# Patient Record
Sex: FEMALE | Race: WHITE | NOT HISPANIC OR LATINO | Employment: UNEMPLOYED | URBAN - METROPOLITAN AREA
[De-identification: names, ages, dates, MRNs, and addresses within clinical notes are randomized per-mention and may not be internally consistent; named-entity substitution may affect disease eponyms.]

---
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
## 2023-01-11 ENCOUNTER — HOSPITAL ENCOUNTER (EMERGENCY)
Facility: MEDICAL CENTER | Age: 18
End: 2023-01-11
Attending: OBSTETRICS & GYNECOLOGY | Admitting: OBSTETRICS & GYNECOLOGY
Payer: MEDICAID

## 2023-01-11 ENCOUNTER — HOSPITAL ENCOUNTER (EMERGENCY)
Facility: MEDICAL CENTER | Age: 18
End: 2023-01-11
Payer: MEDICAID

## 2023-01-11 VITALS
SYSTOLIC BLOOD PRESSURE: 111 MMHG | RESPIRATION RATE: 16 BRPM | OXYGEN SATURATION: 99 % | HEART RATE: 98 BPM | DIASTOLIC BLOOD PRESSURE: 74 MMHG | WEIGHT: 156.97 LBS | HEIGHT: 64 IN | TEMPERATURE: 98.5 F | BODY MASS INDEX: 26.8 KG/M2

## 2023-01-11 VITALS
OXYGEN SATURATION: 98 % | BODY MASS INDEX: 26.29 KG/M2 | HEART RATE: 99 BPM | WEIGHT: 154 LBS | SYSTOLIC BLOOD PRESSURE: 108 MMHG | RESPIRATION RATE: 14 BRPM | HEIGHT: 64 IN | DIASTOLIC BLOOD PRESSURE: 72 MMHG | TEMPERATURE: 97.8 F

## 2023-01-11 LAB
APPEARANCE UR: CLEAR
COLOR UR AUTO: NORMAL
GLUCOSE UR QL STRIP.AUTO: NEGATIVE MG/DL
KETONES UR QL STRIP.AUTO: NEGATIVE MG/DL
LEUKOCYTE ESTERASE UR QL STRIP.AUTO: NEGATIVE
NITRITE UR QL STRIP.AUTO: NEGATIVE
PH UR STRIP.AUTO: 6 [PH] (ref 5–8)
PROT UR QL STRIP: NEGATIVE MG/DL
RBC UR QL AUTO: NEGATIVE
SP GR UR STRIP.AUTO: 1.02 (ref 1–1.03)

## 2023-01-11 PROCEDURE — 99281 EMR DPT VST MAYX REQ PHY/QHP: CPT | Performed by: NURSE PRACTITIONER

## 2023-01-11 PROCEDURE — 99284 EMERGENCY DEPT VISIT MOD MDM: CPT

## 2023-01-11 PROCEDURE — 302449 STATCHG TRIAGE ONLY (STATISTIC)

## 2023-01-11 PROCEDURE — 81002 URINALYSIS NONAUTO W/O SCOPE: CPT

## 2023-01-11 PROCEDURE — 700102 HCHG RX REV CODE 250 W/ 637 OVERRIDE(OP): Performed by: NURSE PRACTITIONER

## 2023-01-11 PROCEDURE — A9270 NON-COVERED ITEM OR SERVICE: HCPCS | Performed by: NURSE PRACTITIONER

## 2023-01-11 RX ORDER — ACETAMINOPHEN 325 MG/1
650 TABLET ORAL EVERY 4 HOURS PRN
COMMUNITY
End: 2023-03-30

## 2023-01-11 RX ORDER — DIPHENOXYLATE HYDROCHLORIDE AND ATROPINE SULFATE 2.5; .025 MG/1; MG/1
1 TABLET ORAL ONCE
Status: COMPLETED | OUTPATIENT
Start: 2023-01-11 | End: 2023-01-11

## 2023-01-11 RX ADMIN — DIPHENOXYLATE HYDROCHLORIDE AND ATROPINE SULFATE 1 TABLET: 2.5; .025 TABLET ORAL at 08:29

## 2023-01-11 RX ADMIN — LIDOCAINE HYDROCHLORIDE 30 ML: 20 SOLUTION OROPHARYNGEAL at 08:28

## 2023-01-11 ASSESSMENT — PAIN SCALES - GENERAL: PAINLEVEL: 3

## 2023-01-11 ASSESSMENT — FIBROSIS 4 INDEX
FIB4 SCORE: 0.37
FIB4 SCORE: 0.37

## 2023-01-11 NOTE — PROGRESS NOTES
Pt presents to L&D with complaints of upper abdominal pain. Pt ambulated to LDA 2 for assessment.     0716 TOCO and EFM applied, VSS. Pt reports +FM, denies LOF, VB or UC's/ cramping. Pt states she started to develop this upper abdominal pain Monday. Pt has tried taking some tylenol and tums but it has not helped. Pts abdomen is soft but tender in the upper portion. Pt also reports some diarrhea this morning. Pt has not taken anything for the diarrhea. See UA.     0708 JOELLEN Juares updated, orders for GI cocktail and lomotil received.     0755 RN at bedside, pt updated on POC. Pts adoptive mother is at bedside now and reports that the pt has had issues in the past with her GI and was told to stay away from spicy foods. Upon reviewing pts diet, it appears soda and spicy food is what makes her pain worse. Pt educated on pregnancy and how even small amounts can affect her GI tract if she has a history of GI issues in the past.  RN will provide pt with diet recommendations upon leaving if appropriate.     6916 RN at bedside, pt medicated per MAR

## 2023-01-11 NOTE — PROGRESS NOTES
Pt presents to L&D with complaints of upper abdominal pain. Pt ambulated to LDA 2 for assessment.     0716 TOCO and EFM applied, VSS. Pt reports +FM, denies LOF, VB or UC's/ cramping. Pt states she started to develop this upper abdominal pain Monday. Pt has tried taking some tylenol and tums but it has not helped. Pts abdomen is soft but tender in the upper portion. Pt also reports some diarrhea this morning. Pt has not taken anything for the diarrhea. See UA.     0735 JOELLEN Juares updated, orders for GI cocktail and lomotil received.     0755 RN at bedside, pt updated on POC. Pts adoptive mother is at bedside now and reports that the pt has had issues in the past with her GI and was told to stay away from spicy foods. Upon reviewing pts diet, it appears soda and spicy food is what makes her pain worse. Pt educated on pregnancy and how even small amounts can affect her GI tract if she has a history of GI issues in the past.  RN will provide pt with diet recommendations upon leaving if appropriate.     0826 RN at bedside, pt medicated per MAR    0905 RN at bedside, pt feeling better. Pt educated on OTC medications she can take per MD. Pt encouraged to bring up her concerns at her next office visit if the discomfort hasn't resolved. RN will provide pt with diet recommendations related to her hiatal hernia.     0910

## 2023-01-11 NOTE — ED PROVIDER NOTES
"  PATIENT ID:  NAME:  Humaira Mejia  MRN:               [id number]  YOB: 2005     17 y.o. female  at 24w2d.    Subjective: Pt reports epigastric pain with diarrhea this am  Pregnancy complicated by teenage pregnancy, hx of anxiety and depression, hx of medication overdose.    negative  For CTXS.   positive Feels abdominal pain   negative for LOF  negative for vaginal bleeding.   positive for fetal movement    ROS: Patient denies any fever chills, nausea, vomiting, headache, chest pain, shortness of breath, or dysuria or unusual swelling of hands or feet.     Objective:    Vitals:    23 0716 23 0717   BP: 108/72    Pulse: (!) 121 (!) 122   Temp: 36.6 °C (97.8 °F)    TempSrc: Temporal    SpO2:  98%   Weight: 69.9 kg (154 lb)    Height: 1.626 m (5' 4\")      Temp (24hrs), Av.6 °C (97.8 °F), Min:36.6 °C (97.8 °F), Max:36.6 °C (97.8 °F)    General: No acute distress, resting comfortably in bed.  HEENT: normocephalic, nontraumatic, PERRLA, EOMI  Cardiovascular: Heart RRR with no murmurs, rubs or gallops. Distal Pulses 2+  Respiratory: symmetric chest expansion, lungs CTAB, with no wheezes, rales, rhonci  Abdomen: gravid, nontender  Musculoskeletal: strength 5/5 in four extremities  Neuro: non focal with no numbness, tingling or changes in sensation    Cervix:  deferred  [place]: Uterine Contractions: none.   FHRM: Baseline 150, Accels to 160, no decels, minimal variability, adequate for gestational age    Assessment: 17 y.o. female  at 24w2d.      Plan:   1. GI cocktail   2. Lomotil  3. Oral hydration  4. May discharge if feeling better after medicationa nd hydration  5. Instructions for gallbladder diet  6. Follow up in office for MARY or prn if symptoms worsen    Report to Dr Chawla, resident on call    "

## 2023-01-18 RX ORDER — PNV NO.95/FERROUS FUM/FOLIC AC 28MG-0.8MG
TABLET ORAL
Qty: 30 TABLET | Refills: 6 | Status: SHIPPED | OUTPATIENT
Start: 2023-01-18 | End: 2023-07-05

## 2023-01-20 ENCOUNTER — TELEPHONE (OUTPATIENT)
Dept: OBGYN | Facility: CLINIC | Age: 18
End: 2023-01-20
Payer: MEDICAID

## 2023-01-20 NOTE — TELEPHONE ENCOUNTER
Pt called because she is experiencing abdominal pain that comes and goes but doesn't intensify. I stated can be normal pt was given labor precautions.  asked the pt has she been drinking water pt stated no so I stated drinking atleast 2 lters of water a day is necessary. Pt was adamant on going to labor and delivery. I stated unfortunately she is isn't far enough along for labor and delivery but if she is concerned she can go to the emergency room pt stated she understands and call was disconnected.

## 2023-01-25 ENCOUNTER — HOSPITAL ENCOUNTER (OUTPATIENT)
Dept: LAB | Facility: MEDICAL CENTER | Age: 18
End: 2023-01-25
Attending: NURSE PRACTITIONER
Payer: MEDICAID

## 2023-01-25 ENCOUNTER — ROUTINE PRENATAL (OUTPATIENT)
Dept: OBGYN | Facility: CLINIC | Age: 18
End: 2023-01-25
Payer: MEDICAID

## 2023-01-25 VITALS — SYSTOLIC BLOOD PRESSURE: 88 MMHG | WEIGHT: 160 LBS | BODY MASS INDEX: 27.46 KG/M2 | DIASTOLIC BLOOD PRESSURE: 48 MMHG

## 2023-01-25 DIAGNOSIS — Z34.02 ENCOUNTER FOR PRENATAL CARE IN SECOND TRIMESTER OF FIRST PREGNANCY: ICD-10-CM

## 2023-01-25 DIAGNOSIS — Z34.02 ENCOUNTER FOR PRENATAL CARE IN SECOND TRIMESTER OF FIRST PREGNANCY: Primary | ICD-10-CM

## 2023-01-25 LAB — GLUCOSE 1H P 50 G GLC PO SERPL-MCNC: 109 MG/DL (ref 70–139)

## 2023-01-25 PROCEDURE — 0502F SUBSEQUENT PRENATAL CARE: CPT | Performed by: NURSE PRACTITIONER

## 2023-01-25 PROCEDURE — 82950 GLUCOSE TEST: CPT

## 2023-01-25 PROCEDURE — 36415 COLL VENOUS BLD VENIPUNCTURE: CPT

## 2023-01-25 ASSESSMENT — FIBROSIS 4 INDEX: FIB4 SCORE: 0.37

## 2023-01-25 NOTE — PROGRESS NOTES
Pt. Here for OB/FU. Reports Good FM.   Pt. Denies VB, LOF, or UC's.   Pt states she has cramping here and there that comes and goes.   Pt currently says she has a stuffy nose and cough today.   Pt states she will be getting her PNP done after today's apt.
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
## 2023-02-05 ENCOUNTER — OFFICE VISIT (OUTPATIENT)
Dept: URGENT CARE | Facility: CLINIC | Age: 18
End: 2023-02-05
Payer: MEDICAID

## 2023-02-05 ENCOUNTER — HOSPITAL ENCOUNTER (OUTPATIENT)
Facility: MEDICAL CENTER | Age: 18
End: 2023-02-05
Attending: NURSE PRACTITIONER
Payer: MEDICAID

## 2023-02-05 VITALS
TEMPERATURE: 97 F | OXYGEN SATURATION: 97 % | HEART RATE: 96 BPM | WEIGHT: 166 LBS | HEIGHT: 64 IN | BODY MASS INDEX: 28.34 KG/M2 | RESPIRATION RATE: 14 BRPM | DIASTOLIC BLOOD PRESSURE: 68 MMHG | SYSTOLIC BLOOD PRESSURE: 102 MMHG

## 2023-02-05 DIAGNOSIS — J06.9 VIRAL URI: ICD-10-CM

## 2023-02-05 DIAGNOSIS — Z3A.27 27 WEEKS GESTATION OF PREGNANCY: ICD-10-CM

## 2023-02-05 PROCEDURE — 0240U HCHG SARS-COV-2 COVID-19 NFCT DS RESP RNA 3 TRGT MIC: CPT

## 2023-02-05 PROCEDURE — 99213 OFFICE O/P EST LOW 20 MIN: CPT | Performed by: NURSE PRACTITIONER

## 2023-02-05 ASSESSMENT — ENCOUNTER SYMPTOMS
CHILLS: 0
HEADACHES: 0
SORE THROAT: 0
RHINORRHEA: 1
NAUSEA: 0
DIZZINESS: 0
WHEEZING: 0
EYE PAIN: 0
VOMITING: 0
FEVER: 0
SWOLLEN GLANDS: 0
COUGH: 1
SHORTNESS OF BREATH: 0
MYALGIAS: 0

## 2023-02-05 ASSESSMENT — FIBROSIS 4 INDEX: FIB4 SCORE: 0.37

## 2023-02-06 DIAGNOSIS — J06.9 VIRAL URI: ICD-10-CM

## 2023-02-06 NOTE — PROGRESS NOTES
"Subjective:   Humaira Mejia is a 17 y.o. female who presents for Cough (X 3 days with congestion. Denies fever or chills. Pt. Is 27 weeks pregnant. )      URI   This is a new problem. Episode onset: 3 days; fiancé ill with similar symptoms is 27 weeks pregnant.  Vaccinated for influenza. The problem has been unchanged. There has been no fever. Associated symptoms include congestion, coughing and rhinorrhea. Pertinent negatives include no chest pain, ear pain, headaches, nausea, plugged ear sensation, rash, sore throat, swollen glands, vomiting or wheezing. She has tried nothing for the symptoms. The treatment provided no relief.     Review of Systems   Constitutional:  Negative for chills and fever.   HENT:  Positive for congestion and rhinorrhea. Negative for ear pain and sore throat.    Eyes:  Negative for pain.   Respiratory:  Positive for cough. Negative for shortness of breath and wheezing.    Cardiovascular:  Negative for chest pain.   Gastrointestinal:  Negative for nausea and vomiting.   Genitourinary:  Negative for hematuria.   Musculoskeletal:  Negative for myalgias.   Skin:  Negative for rash.   Neurological:  Negative for dizziness and headaches.     Medications:    acetaminophen Tabs  Prenatal Vitamins Tabs    Allergies: Horse allergy and Pollen extract    Problem List: Humaira Mejia does not have any pertinent problems on file.    Surgical History:  No past surgical history on file.    Past Social Hx: Humaira Mejia  reports that she has never smoked. She has been exposed to tobacco smoke. She has never used smokeless tobacco. She reports that she does not drink alcohol and does not use drugs.     Past Family Hx:  Humaira Mejia family history includes No Known Problems in her brother, father, mother, and sister.     Problem list, medications, and allergies reviewed by myself today in Epic.     Objective:     /68   Pulse 96   Temp 36.1 °C (97 °F) (Temporal)   Resp 14   Ht 1.626 m (5' 4\")   Wt 75.3 kg " (166 lb)   LMP 07/24/2022 (Approximate)   SpO2 97%   BMI 28.49 kg/m²     Physical Exam  Vitals and nursing note reviewed.   Constitutional:       General: She is not in acute distress.     Appearance: She is well-developed.   HENT:      Head: Normocephalic and atraumatic.      Right Ear: Tympanic membrane and external ear normal.      Left Ear: Tympanic membrane and external ear normal.      Nose: Nose normal.      Right Sinus: No maxillary sinus tenderness or frontal sinus tenderness.      Left Sinus: No maxillary sinus tenderness or frontal sinus tenderness.      Mouth/Throat:      Mouth: Mucous membranes are moist.      Pharynx: Uvula midline. No posterior oropharyngeal erythema.      Tonsils: No tonsillar exudate or tonsillar abscesses.   Eyes:      General:         Right eye: No discharge.         Left eye: No discharge.      Conjunctiva/sclera: Conjunctivae normal.   Cardiovascular:      Rate and Rhythm: Normal rate.   Pulmonary:      Effort: Pulmonary effort is normal. No respiratory distress.      Breath sounds: Normal breath sounds.   Abdominal:      General: There is no distension.   Musculoskeletal:         General: Normal range of motion.   Skin:     General: Skin is warm and dry.   Neurological:      General: No focal deficit present.      Mental Status: She is alert and oriented to person, place, and time. Mental status is at baseline.      Gait: Gait (gait at baseline) normal.   Psychiatric:         Judgment: Judgment normal.       Assessment/Plan:     Diagnosis and associated orders:     1. Viral URI  CoV-2 and Flu A/B by PCR (24 hour In-House): Collect NP swab in VTM      2. 27 weeks gestation of pregnancy             Comments/MDM:     The patient's presenting symptoms and exam findings most likely are due to a viral etiology.     Test for COVID-19 via PCR. Result will be reviewed by myself. We will call/message back for results and appropriate further instructions. Instructed to sign up for  [employer] if they have not already. Result will be automatically released to [employer] application for patient review. I will be sending a message with Next Step Instructions to [employer] soon after resulted.   Symptomatic and supportive care:   Plenty of oral hydration and rest   Over the counter cough suppressant as directed.  Tylenol or ibuprofen for pain and fever as directed.   Warm salt water gargles for sore throat, soft foods, cool liquids.   Saline nasal spray and Flonase  Infection control measures at home. Stay away from people, Hand washing, covering sneeze/cough, disinfect surfaces.   Remain home from work, school, and other populated environments. Work note provided with information of quarantine measures per CDC guidelines.   Overall, the patient is well-appearing. They are not hypoxic, afebrile, and a normal pulmonary exam.    Differential diagnosis, natural history, supportive care, and indications for immediate follow-up discussed.                  Please note that this dictation was created using voice recognition software. I have made a reasonable attempt to correct obvious errors, but I expect that there are errors of grammar and possibly content that I did not discover before finalizing the note.    This note was electronically signed by Hector JORGE.

## 2023-02-07 LAB
FLUAV RNA SPEC QL NAA+PROBE: NEGATIVE
FLUBV RNA SPEC QL NAA+PROBE: NEGATIVE
SARS-COV-2 RNA RESP QL NAA+PROBE: NOTDETECTED
SPECIMEN SOURCE: NORMAL

## 2023-02-15 ENCOUNTER — ROUTINE PRENATAL (OUTPATIENT)
Dept: OBGYN | Facility: CLINIC | Age: 18
End: 2023-02-15
Payer: MEDICAID

## 2023-02-15 VITALS — BODY MASS INDEX: 29.01 KG/M2 | WEIGHT: 169 LBS | SYSTOLIC BLOOD PRESSURE: 110 MMHG | DIASTOLIC BLOOD PRESSURE: 64 MMHG

## 2023-02-15 DIAGNOSIS — O26.843 UTERINE SIZE DATE DISCREPANCY PREGNANCY, THIRD TRIMESTER: ICD-10-CM

## 2023-02-15 DIAGNOSIS — Z34.03 ENCOUNTER FOR PRENATAL CARE IN THIRD TRIMESTER OF FIRST PREGNANCY: Primary | ICD-10-CM

## 2023-02-15 PROCEDURE — 90715 TDAP VACCINE 7 YRS/> IM: CPT | Performed by: NURSE PRACTITIONER

## 2023-02-15 PROCEDURE — 0502F SUBSEQUENT PRENATAL CARE: CPT | Performed by: NURSE PRACTITIONER

## 2023-02-15 PROCEDURE — 90471 IMMUNIZATION ADMIN: CPT | Performed by: NURSE PRACTITIONER

## 2023-02-15 ASSESSMENT — FIBROSIS 4 INDEX: FIB4 SCORE: 0.37
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
## 2023-03-01 ENCOUNTER — APPOINTMENT (OUTPATIENT)
Dept: RADIOLOGY | Facility: IMAGING CENTER | Age: 18
End: 2023-03-01
Attending: NURSE PRACTITIONER
Payer: MEDICAID

## 2023-03-01 ENCOUNTER — ROUTINE PRENATAL (OUTPATIENT)
Dept: OBGYN | Facility: CLINIC | Age: 18
End: 2023-03-01
Payer: MEDICAID

## 2023-03-01 VITALS — WEIGHT: 172 LBS | DIASTOLIC BLOOD PRESSURE: 70 MMHG | SYSTOLIC BLOOD PRESSURE: 100 MMHG | BODY MASS INDEX: 29.52 KG/M2

## 2023-03-01 DIAGNOSIS — Z34.03 ENCOUNTER FOR PRENATAL CARE IN THIRD TRIMESTER OF FIRST PREGNANCY: ICD-10-CM

## 2023-03-01 DIAGNOSIS — O26.843 UTERINE SIZE DATE DISCREPANCY PREGNANCY, THIRD TRIMESTER: ICD-10-CM

## 2023-03-01 PROCEDURE — 0502F SUBSEQUENT PRENATAL CARE: CPT | Performed by: PHYSICIAN ASSISTANT

## 2023-03-01 PROCEDURE — 76816 OB US FOLLOW-UP PER FETUS: CPT | Mod: TC | Performed by: NURSE PRACTITIONER

## 2023-03-01 ASSESSMENT — FIBROSIS 4 INDEX: FIB4 SCORE: 0.37

## 2023-03-01 NOTE — PROGRESS NOTES
OB follow up   + fetal movement.  No VB, LOF or UC's.  Phone # [phone]  Preferred pharmacy confirmed.  NIPT not done yet  Kick count sheet given today.

## 2023-03-01 NOTE — LETTER
"Count Your Baby's Movements  Another step to a healthy delivery    A Epic Dress Re Test             Dept: [phone]    How Many Weeks Pregnant? Unknown    Date to Begin Counting: 3/1/23              How to use this chart    One way for your physician to keep track of your baby's health is by knowing how often the baby moves (or \"kicks\") in your womb.  You can help your physician to do this by using this chart every day.    Every day, you should see how many hours it takes for your baby to move 10 times.  Start in the morning, as soon as you get up.    First, write down the time your baby moves until you get to 10.  Check off one box every time your baby moves until you get to 10.  Write down the time you finished counting in the last column.  Total how long it took to count up all 10 movements.  Finally, fill in the box that shows how long this took.  After counting 10 movements, you no longer have to count any more that day.  The next morning, just start counting again as soon as you get up.    What should you call a \"movement\"?  It is hard to say, because it will feel different from one mother to another and from one pregnancy to the next.  The important thing is that you count the movements the same way throughout your pregnancy.  If you have more questions, you should ask your physician.    Count carefully every day!  SAMPLE:  Week 28    How many hours did it take to feel 10 movements?       Start  Time     1     2     3     4     5     6     7     8     9     10   Finish Time   Mon 8:20           11:40   Tue Wed Thu               Fri               Sat               Sun                 IMPORTANT: You should contact your physician if it takes more than two hours for you to feel 10 movements.  Each morning, write down the time and start to count the movements of your baby.  Keep track by checking off one box every time you feel one movement.  When you have felt 10 \"kicks\", write down " the time you finished counting in the last column.  Then fill in the   box (over the check kiana) for the number of hours it took.  Be sure to read the complete instructions on the previous page.

## 2023-03-01 NOTE — PROGRESS NOTES
S: 17 y.o.  at 31w2d presents for routine obstetric follow-up.   Good fetal movement.  No contractions, vaginal bleeding, or leakage of fluid.    Questions answered.  Reports left ear pain, no URI sx or fever.       O: LMP 2022 (Approximate) Estimated Date of Delivery: 23  Patients' weight gain, fluid intake and exercise level discussed.  Vitals, fundal height , fetal position, and FHR reviewed on flowsheet  Ear: Left canal cerumen impaction, TM obscured. Right TM clear, no erythema.    Lab: 1hGTT completed but not CBC or Syphilis, pt not sure why. Was reprinted last week but pt did not go back to lab.   Pertinent US: 2022- Survey WNL, NICK 17.19cm, c/w prev dating. EFW 72.4%  TDaP: Administered 2/15/23  GBS: N/A  Rh: positive  BTL: Declined 2/15/23    A/P:  17 y.o.  at 31w2d presents for routine obstetric follow-up.  Size equals dates and/or scan    - Has growth US after visit today due to measuring S>D at last visit. Measuring S=D today.   - Reprinted CBC and T Pallidum - encouraged pt to do ASAP  - Instructed on ear hygiene. Recommend Debrox drops before shower to soften and use shower as lavage. Avoid q-tips. Can go to PCP or UC for lavage if not successful at home. Advised TM obscured AOM cannot be r/o.   - Fetal kick counts- tracking given.  - Exercise at least 30 minutes daily. Drink at least 3-4L of water daily  - PTL precautions educated.    Follow-up in 2 weeks.    Mariah Kessler P.A.-C.

## 2023-03-02 ENCOUNTER — HOSPITAL ENCOUNTER (EMERGENCY)
Facility: MEDICAL CENTER | Age: 18
End: 2023-03-02
Attending: EMERGENCY MEDICINE
Payer: MEDICAID

## 2023-03-02 VITALS
DIASTOLIC BLOOD PRESSURE: 55 MMHG | HEART RATE: 102 BPM | OXYGEN SATURATION: 95 % | RESPIRATION RATE: 16 BRPM | SYSTOLIC BLOOD PRESSURE: 111 MMHG | TEMPERATURE: 97.5 F | WEIGHT: 172.18 LBS | BODY MASS INDEX: 29.4 KG/M2 | HEIGHT: 64 IN

## 2023-03-02 DIAGNOSIS — H61.22 IMPACTED CERUMEN OF LEFT EAR: ICD-10-CM

## 2023-03-02 DIAGNOSIS — B34.9 VIRAL ILLNESS: ICD-10-CM

## 2023-03-02 DIAGNOSIS — H92.02 LEFT EAR PAIN: ICD-10-CM

## 2023-03-02 LAB
EKG IMPRESSION: NORMAL
FLUAV RNA SPEC QL NAA+PROBE: NEGATIVE
FLUBV RNA SPEC QL NAA+PROBE: NEGATIVE
RSV RNA SPEC QL NAA+PROBE: NEGATIVE
SARS-COV-2 RNA RESP QL NAA+PROBE: NOTDETECTED
SPECIMEN SOURCE: NORMAL

## 2023-03-02 PROCEDURE — C9803 HOPD COVID-19 SPEC COLLECT: HCPCS | Performed by: EMERGENCY MEDICINE

## 2023-03-02 PROCEDURE — 69210 REMOVE IMPACTED EAR WAX UNI: CPT

## 2023-03-02 PROCEDURE — 700102 HCHG RX REV CODE 250 W/ 637 OVERRIDE(OP): Performed by: EMERGENCY MEDICINE

## 2023-03-02 PROCEDURE — 0241U HCHG SARS-COV-2 COVID-19 NFCT DS RESP RNA 4 TRGT MIC: CPT

## 2023-03-02 PROCEDURE — 93005 ELECTROCARDIOGRAM TRACING: CPT

## 2023-03-02 PROCEDURE — 99283 EMERGENCY DEPT VISIT LOW MDM: CPT

## 2023-03-02 PROCEDURE — 93005 ELECTROCARDIOGRAM TRACING: CPT | Performed by: EMERGENCY MEDICINE

## 2023-03-02 PROCEDURE — A9270 NON-COVERED ITEM OR SERVICE: HCPCS | Performed by: EMERGENCY MEDICINE

## 2023-03-02 RX ORDER — ACETAMINOPHEN 325 MG/1
650 TABLET ORAL ONCE
Status: COMPLETED | OUTPATIENT
Start: 2023-03-02 | End: 2023-03-02

## 2023-03-02 RX ORDER — DOCUSATE SODIUM 50 MG/5ML
100 LIQUID ORAL ONCE
Status: COMPLETED | OUTPATIENT
Start: 2023-03-02 | End: 2023-03-02

## 2023-03-02 RX ADMIN — ACETAMINOPHEN 650 MG: 325 TABLET ORAL at 19:47

## 2023-03-02 RX ADMIN — DOCUSATE SODIUM 100 MG: 50 LIQUID ORAL at 19:47

## 2023-03-02 ASSESSMENT — FIBROSIS 4 INDEX: FIB4 SCORE: 0.37

## 2023-03-03 NOTE — ED NOTES
Patient provided with discharge instructions. Education complete, all questions answered.   Vitals stable. All personal belongings accounted for.   Patient ambulated out of the ER with family.

## 2023-03-03 NOTE — ED TRIAGE NOTES
".  Chief Complaint   Patient presents with    Cold Symptoms     Cough, congestion, headache, ear pain, chest pain, denies fevers. Patient is 31 weeks pregnant.        16 yo female ambulatory to triage for above complaint. EKG upon arrival. Patient is in no apparent distress in triage.      Pt is alert and oriented, speaking in full sentences, follows commands and responds appropriately to questions.      Patient placed back in lobby and educated on triage process. Asked to inform RN of any changes.    /72   Pulse (!) 130   Temp 36.7 °C (98 °F) (Oral)   Resp 18   Ht 1.626 m (5' 4\")   Wt 78.1 kg (172 lb 2.9 oz)   LMP 07/24/2022 (Approximate)   SpO2 96%   BMI 29.55 kg/m²     "

## 2023-03-03 NOTE — ED PROVIDER NOTES
ED Provider Note    CHIEF COMPLAINT  Chief Complaint   Patient presents with    Cold Symptoms     Cough, congestion, headache, ear pain, chest pain, denies fevers. Patient is 31 weeks pregnant.        EXTERNAL RECORDS REVIEWED  Outpatient Notes OB note from 3/1/23    HPI/ROS  LIMITATION TO HISTORY   Select: : None  OUTSIDE HISTORIAN(S):  Family Significant other    Humaira Mejia is a 17 y.o. female who presents to the emergency department for evaluation of cough, congestion, ear pain, and a headache.  The patient states that she has had left-sided ear pain over the last 3 to 4 days.  She has had runny nose, nasal congestion, and a cough.  She has not had any nausea, vomiting, chest pain, or difficulty breathing.  Her boyfriend is sick with similar symptoms.  She denies any known fevers.  She is 31 weeks and 3 days pregnant and is following with OB.  She denies any vaginal bleeding or discharge.  She denies dysuria or hematuria.  She states that she has been feeling the baby move normally.    PAST MEDICAL HISTORY   has a past medical history of Patient denies medical problems.    SURGICAL HISTORY  patient denies any surgical history    FAMILY HISTORY  Family History   Problem Relation Age of Onset    No Known Problems Mother     No Known Problems Father     No Known Problems Sister     No Known Problems Brother        SOCIAL HISTORY  Social History     Tobacco Use    Smoking status: Never     Passive exposure: Yes    Smokeless tobacco: Never   Vaping Use    Vaping Use: Former    Substances: Nicotine    Devices: Disposable   Substance and Sexual Activity    Alcohol use: No    Drug use: No    Sexual activity: Yes     Partners: Male     Birth control/protection: None       CURRENT MEDICATIONS  Home Medications       Reviewed by Ny Hickey R.N. (Registered Nurse) on 03/02/23 at 1723  Med List Status: Partial     Medication Last Dose Status   acetaminophen (TYLENOL) 325 MG Tab  Active   Prenatal Vit-Fe Fumarate-FA  "(PRENATAL VITAMINS) 28-0.8 MG Tab  Active                    ALLERGIES  Allergies   Allergen Reactions    Horse Allergy Itching    Pollen Extract Runny Nose     Runny nose       PHYSICAL EXAM  VITAL SIGNS: /63   Pulse (!) 109   Temp 36.7 °C (98 °F) (Oral)   Resp 18   Ht 1.626 m (5' 4\")   Wt 78.1 kg (172 lb 2.9 oz)   LMP 07/24/2022 (Approximate)   SpO2 100%   BMI 29.55 kg/m²   Constitutional: Alert and in no apparent distress.  HENT: Normocephalic atraumatic. Bilateral external ears normal.  Right TM's clear.  Unable to visualize left TM secondary to cerumen impaction.  Nose normal. Mucous membranes are moist.  Eyes: Pupils are equal and reactive. Conjunctiva normal. Non-icteric sclera.   Neck: Normal range of motion without tenderness. Supple. No meningeal signs.  Cardiovascular: Regular rate and rhythm. No murmurs, gallops or rubs.  Thorax & Lungs: No retractions, nasal flaring, or tachypnea. Breath sounds are clear to auscultation bilaterally. No wheezing, rhonchi or rales.  Abdomen: Soft, nontender and nondistended. No hepatosplenomegaly.  Gravid uterus present above the umbilicus.  Skin: Warm and dry. No rashes are noted.  Back: No bony tenderness, No CVA tenderness.   Extremities: 2+ peripheral pulses. Cap refill is less than 2 seconds. No edema, cyanosis, or clubbing.  Musculoskeletal: Good range of motion in all major joints. No tenderness to palpation or major deformities noted.   Neurologic: Alert and appropriate for age. The patient moves all 4 extremities without obvious deficits.    DIAGNOSTIC STUDIES / PROCEDURES    LABS  Results for orders placed or performed during the hospital encounter of 03/02/23   EKG (NOW)   Result Value Ref Range    Report       [facility] Emergency Dept.    Test Date:  2023-03-02  Pt Name:    VINCENT ARTHUR                  Department: ER  MRN:        [id number]                      Room:  Gender:     Female                       Technician: " [id number]  :        2005                   Requested By:ER TRIAGE PROTOCOL  Order #:    [id number]                    Reading MD: Halina Reno    Measurements  Intervals                                Axis  Rate:       144                          P:          43  FL:         116                          QRS:        46  QRSD:       83                           T:          -33  QT:         282  QTc:        437    Interpretive Statements  Sinus tachycardia  Nonspecific T abnormalities, inferior leads  Compared to ECG 2019 17:25:38  T-wave abnormality now present  Sinus rhythm no longer present  Electronically Signed On 3-2-2023 19:22:03 PST by Halina Reno       Ear Cerumen Removal Procedure    Indication: ear cerumen impaction    Procedure: After placing the patient's head in the appropriate position, the patient's left ear canal was irrigated with the appropriate solution and curetted until the procedure was discontinued at the patient's request.  At this point, the procedure was complete.     The patient tolerated the procedure with difficulty.    Complications: None      COURSE & MEDICAL DECISION MAKING    ED Observation Status? Yes; I am placing the patient in to an observation status due to a diagnostic uncertainty as well as therapeutic intensity. Patient placed in observation status at 8:44 PM, 3/2/2023.     Observation plan is as follows: Viral panel and reassessment    INITIAL ASSESSMENT, COURSE AND PLAN  Care Narrative: This is a 17-year-old female presenting to the ED for evaluation of cold symptoms.  On initial evaluation, the patient did not appear to be in any acute distress.  She was noted be tachycardic but the remainder of her vitals were normal.  Her perfusion mental status were appropriate.  Her lung sounds were clear and she had no evidence of focal crackles or rhonchi concerning for bacterial pneumonia.  Additionally she had no history of fevers.  She had full range of motion of her neck and  have extremely low clinical suspicion for meningitis.  She did have an obvious cerumen impaction on the left and I was unable to visualize her tympanic membrane.  I did attempt to remove the cerumen with a curette and then with Colace and subsequent flushing.  She did not tolerate this well in the procedure was discontinued.  Given that she has not had fevers and has only had ear pain for 1 to 2 days I am less concerned for an acute otitis media at this point.  However, she will be prescribed Debrox and encouraged to use it over the next couple of days to hopefully remove the cerumen.    I did perform a limited bedside ultrasound.  Appropriate fetal movement was noted.  Fetal heart rate was measured at 156.    A viral panel was sent and pending.  The patient was signed out to Dr Amin pending results of the viral panel as the patient is a Tamiflu candidate should she be influenza positive.    ADDITIONAL PROBLEM LIST  Viral illness, ear pain, cerumen impaction  DISPOSITION AND DISCUSSIONS  I have discussed management of the patient with the following physicians and JACEY's: Dr. Amin, ERP    Discussion of management with other [facility] or appropriate source(s): None     Escalation of care considered, and ultimately not performed:acute inpatient care management, however at this time, the patient is most appropriate for outpatient management    Barriers to care at this time, including but not limited to:  None .     Decision tools and prescription drugs considered including, but not limited to:  Debrox otic .    FINAL DIAGNOSIS  1. Viral illness    2. Left ear pain    3. Impacted cerumen of left ear        Electronically signed by: Halina Reno D.O., 3/2/2023 7:17 PM

## 2023-03-03 NOTE — DISCHARGE SUMMARY
"  ED Observation Discharge Summary    Patient:Humaira Mejia  Patient : 2005  Patient MRN: [id number]  Patient PCP: Ny Mei D.O.    Admit Date: 3/2/2023  Discharge Date and Time: 23 9:11 PM  Discharge Diagnosis: Viral illness, cough, nasal congestion, ear pain  Discharge Attending: Symone Amin M.D.  Discharge Service: ED Observation    ED Course  Humaira is a 17 y.o. female 31 weeks and 3 days pregnant who was evaluated at [facility] for cough, nasal congestion, ear pain and headache for 3 to 4 days.  Patient is signed out to me for follow-up on flu/COVID/RSV swab.  Patient tolerated p.o. well, vital signs reassuring with heart rate in the low 100s.  Flu, COVID, RSV negative.  Patient was reevaluated by myself.  She denies pleuritic chest pain, shortness of breath, lower extremity calf pain or swelling.  She states she has only chest pain when she coughs.  Symptoms appear more viral in etiology.  She denies any abdominal pain, vaginal bleeding or discharge.  She denies vomiting or diarrhea and has been taking p.o. well.  She has prompt follow-up with her OB.  She is informed of her results, educated on home care for viral illness, given strict ER return precautions.  She is comfortable plan with outpatient follow-up and discharged in good condition.    Discharge Exam:  /63   Pulse (!) 102   Temp 36.7 °C (98 °F) (Oral)   Resp 18   Ht 1.626 m (5' 4\")   Wt 78.1 kg (172 lb 2.9 oz)   LMP 2022 (Approximate)   SpO2 95%   BMI 29.55 kg/m² .    Constitutional: Awake and alert. Nontoxic  HENT:  Grossly normal, moist mucus membranes  Eyes: Grossly normal  Neck: Normal range of motion  Cardiovascular: mild tachycardia at 102 bpm  Thorax & Lungs: No respiratory distress  Abdomen: gravid uterus  Skin:  No pathologic rash. skin warm/pink/well perfused  Extremities: Well perfused, no lower extremity swelling or edema  Psychiatric: Affect normal    Labs  Results for orders placed or performed " during the hospital encounter of 23   COV-2, FLU A/B, AND RSV BY PCR (2-4 HOURS CEPHEID): Collect NP swab in VTM    Specimen: Respirate   Result Value Ref Range    Influenza virus A RNA Negative Negative    Influenza virus B, PCR Negative Negative    RSV, PCR Negative Negative    SARS-CoV-2 by PCR NotDetected     SARS-CoV-2 Source NP Swab    EKG (NOW)   Result Value Ref Range    Report       [facility] Emergency Dept.    Test Date:  2023  Pt Name:    VINCENT ARTHUR                  Department: ER  MRN:        [id number]                      Room:  Gender:     Female                       Technician: [id number]  :        2005                   Requested By:ER TRIAGE PROTOCOL  Order #:    [id number]                    Reading MD: Halina Reno    Measurements  Intervals                                Axis  Rate:       144                          P:          43  MO:         116                          QRS:        46  QRSD:       83                           T:          -33  QT:         282  QTc:        437    Interpretive Statements  Sinus tachycardia  Nonspecific T abnormalities, inferior leads  Compared to ECG 2019 17:25:38  T-wave abnormality now present  Sinus rhythm no longer present  Electronically Signed On 3-2-2023 19:22:03 PST by Halina Reno         Medications:   New Prescriptions    CARBAMIDE PEROXIDE (DEBROX) 6.5 % SOLUTION    Administer 6 Drops into affected ear(s) 2 times a day for 4 days.       My final assessment includes viral illness  Upon Reevaluation, the patient's condition has: Improved; and will be discharged.    Patient discharged from ED Observation status at 10:05 (Time) 3/2/2023 (Date).     Total time spent on this ED Observation discharge encounter is < 30 Minutes    Electronically signed by: Symone Amin M.D., 3/2/2023 9:11 PM

## 2023-03-16 ENCOUNTER — ROUTINE PRENATAL (OUTPATIENT)
Dept: OBGYN | Facility: CLINIC | Age: 18
End: 2023-03-16
Payer: MEDICAID

## 2023-03-16 VITALS — SYSTOLIC BLOOD PRESSURE: 102 MMHG | DIASTOLIC BLOOD PRESSURE: 64 MMHG | BODY MASS INDEX: 30.62 KG/M2 | WEIGHT: 178.4 LBS

## 2023-03-16 DIAGNOSIS — Z34.03 ENCOUNTER FOR PRENATAL CARE IN THIRD TRIMESTER OF FIRST PREGNANCY: ICD-10-CM

## 2023-03-16 PROCEDURE — 0502F SUBSEQUENT PRENATAL CARE: CPT | Performed by: PHYSICIAN ASSISTANT

## 2023-03-16 ASSESSMENT — FIBROSIS 4 INDEX: FIB4 SCORE: 0.37

## 2023-03-16 NOTE — PROGRESS NOTES
Pt has no complaints with cramping, UCs, Vb, LOF though pt has had occ tightening of abd at night only. +FM. Pt taking PNV gummies, c/o feeling very tired, so d/w pt need to take PO FeSO4 and incr calcium as well. Pt very receptive. GBS at 36wk. Pt still hasnt done PNL yet - has limitations with rides, but will try to do this week. Emphasized importance of checking iron levels, infections and need for blood type. Pt states she will try this week. PTL precautions reviewed. Daily FKC recommended. US done last visit for S>D wnl, no further US needed. RTC 2 wk or sooner prn.

## 2023-03-16 NOTE — PROGRESS NOTES
Pt. Here for OB/FU. Reports Good FM.   Pt states no complaints.   PNP and Urine drug screen reprinted on last visit, pt has not done her PNP yet reprinted again today and instructed to do before her next visit.

## 2023-03-30 ENCOUNTER — ROUTINE PRENATAL (OUTPATIENT)
Dept: OBGYN | Facility: CLINIC | Age: 18
End: 2023-03-30
Payer: MEDICAID

## 2023-03-30 ENCOUNTER — HOSPITAL ENCOUNTER (OUTPATIENT)
Facility: MEDICAL CENTER | Age: 18
End: 2023-03-30
Attending: NURSE PRACTITIONER
Payer: MEDICAID

## 2023-03-30 VITALS — SYSTOLIC BLOOD PRESSURE: 104 MMHG | WEIGHT: 182 LBS | BODY MASS INDEX: 31.24 KG/M2 | DIASTOLIC BLOOD PRESSURE: 78 MMHG

## 2023-03-30 DIAGNOSIS — Z34.03 SUPERVISION OF NORMAL FIRST PREGNANCY IN THIRD TRIMESTER: ICD-10-CM

## 2023-03-30 DIAGNOSIS — Z34.02 ENCOUNTER FOR PRENATAL CARE IN SECOND TRIMESTER OF FIRST PREGNANCY: ICD-10-CM

## 2023-03-30 DIAGNOSIS — Z34.03 ENCOUNTER FOR PRENATAL CARE IN THIRD TRIMESTER OF FIRST PREGNANCY: ICD-10-CM

## 2023-03-30 LAB
ABO GROUP BLD: NORMAL
BLD GP AB SCN SERPL QL: NORMAL
ERYTHROCYTE [DISTWIDTH] IN BLOOD BY AUTOMATED COUNT: 47.3 FL (ref 35.9–50)
HBV SURFACE AG SER QL: ABNORMAL
HCT VFR BLD AUTO: 33.5 % (ref 37–47)
HCV AB SER QL: ABNORMAL
HGB BLD-MCNC: 10 G/DL (ref 12–16)
HIV 1+2 AB+HIV1 P24 AG SERPL QL IA: NORMAL
MCH RBC QN AUTO: 25.8 PG (ref 27–33)
MCHC RBC AUTO-ENTMCNC: 29.9 G/DL (ref 33.6–35)
MCV RBC AUTO: 86.3 FL (ref 81.4–97.8)
PLATELET # BLD AUTO: 145 K/UL (ref 164–446)
RBC # BLD AUTO: 3.88 M/UL (ref 4.2–5.4)
RH BLD: NORMAL
RUBV AB SER QL: 11.5 IU/ML
T PALLIDUM AB SER QL IA: ABNORMAL
WBC # BLD AUTO: 7.5 K/UL (ref 4.8–10.8)

## 2023-03-30 PROCEDURE — 87086 URINE CULTURE/COLONY COUNT: CPT

## 2023-03-30 PROCEDURE — 86803 HEPATITIS C AB TEST: CPT

## 2023-03-30 PROCEDURE — 86900 BLOOD TYPING SEROLOGIC ABO: CPT

## 2023-03-30 PROCEDURE — 86762 RUBELLA ANTIBODY: CPT

## 2023-03-30 PROCEDURE — 87389 HIV-1 AG W/HIV-1&-2 AB AG IA: CPT

## 2023-03-30 PROCEDURE — 85027 COMPLETE CBC AUTOMATED: CPT

## 2023-03-30 PROCEDURE — 80307 DRUG TEST PRSMV CHEM ANLYZR: CPT

## 2023-03-30 PROCEDURE — 0502F SUBSEQUENT PRENATAL CARE: CPT | Performed by: ADVANCED PRACTICE MIDWIFE

## 2023-03-30 PROCEDURE — 36415 COLL VENOUS BLD VENIPUNCTURE: CPT

## 2023-03-30 PROCEDURE — 86780 TREPONEMA PALLIDUM: CPT

## 2023-03-30 PROCEDURE — 86850 RBC ANTIBODY SCREEN: CPT

## 2023-03-30 PROCEDURE — 86901 BLOOD TYPING SEROLOGIC RH(D): CPT

## 2023-03-30 PROCEDURE — 87340 HEPATITIS B SURFACE AG IA: CPT

## 2023-03-30 ASSESSMENT — FIBROSIS 4 INDEX: FIB4 SCORE: 0.37
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
## 2023-03-30 NOTE — PROGRESS NOTES
Pt. Here for OB/FU. Reports Good +FM.   Pt. Denies VB, LOF, or UC's.   Pt states she experienced some back pain and cramping yesterday in the car.   Pt states she lost her PNP/NIPT's form and kit. Needs labs reprinted.   Walked pt to lab.

## 2023-03-30 NOTE — LETTER
March 30, 2023     RE: Humaira Mejia  YOB: 2005      To Whom It May Concern:     Please be advised that Humaira is under my care for her current pregnancy and has due date of 5/1/2023. At this time, travel is not recommended past 36 weeks. Specifically in her case due to fetal engagement in pelvis.     Sincerely,         ONEL Mcbride

## 2023-03-31 DIAGNOSIS — O99.013 ANEMIA DURING PREGNANCY IN THIRD TRIMESTER: ICD-10-CM

## 2023-03-31 PROBLEM — O99.019 ANEMIA IN PREGNANCY: Status: ACTIVE | Noted: 2023-03-31

## 2023-03-31 PROBLEM — D69.6 THROMBOCYTOPENIA (HCC): Status: ACTIVE | Noted: 2023-03-31

## 2023-03-31 RX ORDER — FERROUS SULFATE 325(65) MG
325 TABLET ORAL DAILY
Qty: 60 TABLET | Refills: 2 | Status: SHIPPED | OUTPATIENT
Start: 2023-03-31 | End: 2023-07-05

## 2023-04-01 LAB
AMPHET CTO UR CFM-MCNC: NEGATIVE NG/ML
BACTERIA UR CULT: NORMAL
BARBITURATES CTO UR CFM-MCNC: NEGATIVE NG/ML
BENZODIAZ CTO UR CFM-MCNC: NEGATIVE NG/ML
CANNABINOIDS CTO UR CFM-MCNC: NEGATIVE NG/ML
COCAINE CTO UR CFM-MCNC: NEGATIVE NG/ML
DRUG COMMENT 753798: NORMAL
METHADONE CTO UR CFM-MCNC: NEGATIVE NG/ML
OPIATES CTO UR CFM-MCNC: NEGATIVE NG/ML
PCP CTO UR CFM-MCNC: NEGATIVE NG/ML
PROPOXYPH CTO UR CFM-MCNC: NEGATIVE NG/ML
SIGNIFICANT IND 70042: NORMAL
SITE SITE: NORMAL
SOURCE SOURCE: NORMAL

## 2023-04-03 ENCOUNTER — TELEPHONE (OUTPATIENT)
Dept: OBGYN | Facility: CLINIC | Age: 18
End: 2023-04-03
Payer: COMMERCIAL

## 2023-04-03 NOTE — TELEPHONE ENCOUNTER
4/3/23 : Called pt and made her aware she is anemic based off of her blood work. Recommended to her she increases her iron intake through iron rich foods and to also  the iron supplement Natalya sent to the pharmacy on file. Pt said she would talk with her mom about this tomorrow. I asked again for clarification if she will be picking up the supplement and she said yes. Also let her know if for some reason she cannot get the RX that she can get OTC iron supplements which will be the same as the RX. Pt stated her understanding. Let her know to call us if she has any questions.

## 2023-04-05 ENCOUNTER — ROUTINE PRENATAL (OUTPATIENT)
Dept: OBGYN | Facility: CLINIC | Age: 18
End: 2023-04-05
Payer: MEDICAID

## 2023-04-05 ENCOUNTER — HOSPITAL ENCOUNTER (OUTPATIENT)
Facility: MEDICAL CENTER | Age: 18
End: 2023-04-05
Attending: PHYSICIAN ASSISTANT
Payer: MEDICAID

## 2023-04-05 VITALS — DIASTOLIC BLOOD PRESSURE: 70 MMHG | BODY MASS INDEX: 31.24 KG/M2 | SYSTOLIC BLOOD PRESSURE: 104 MMHG | WEIGHT: 182 LBS

## 2023-04-05 DIAGNOSIS — Z34.03 ENCOUNTER FOR PRENATAL CARE IN THIRD TRIMESTER OF FIRST PREGNANCY: ICD-10-CM

## 2023-04-05 PROCEDURE — 87150 DNA/RNA AMPLIFIED PROBE: CPT

## 2023-04-05 PROCEDURE — 0502F SUBSEQUENT PRENATAL CARE: CPT | Performed by: PHYSICIAN ASSISTANT

## 2023-04-05 PROCEDURE — 87081 CULTURE SCREEN ONLY: CPT

## 2023-04-05 ASSESSMENT — FIBROSIS 4 INDEX: FIB4 SCORE: 0.47

## 2023-04-05 NOTE — PROGRESS NOTES
Pt. Here for OB/FU. Reports Good FM.  Pt. Denies VB, LOF, or UC's.   Pt states she did have a contraction on 4/2.   GBS today.

## 2023-04-05 NOTE — PROGRESS NOTES
S: 17 y.o.  at 36w2d presents for routine obstetric follow-up.   Good fetal movement.  No contractions, vaginal bleeding, or leakage of fluid.    Questions answered.    Did not  iron supplement sent to pharmacy. Has not been taking PNV as she has been out and has been taking gummies not pills.     O: LMP 2022 (Approximate)   Patients' weight gain, fluid intake and exercise level discussed.  Vitals, fundal height , fetal position, and FHR reviewed on flowsheet    Labs   Component      Latest Ref Rng 3/30/2023   WBC      4.8 - 10.8 K/uL 7.5    RBC      4.20 - 5.40 M/uL 3.88 (L)    Hemoglobin      12.0 - 16.0 g/dL 10.0 (L)    Hematocrit      37.0 - 47.0 % 33.5 (L)    MCV      81.4 - 97.8 fL 86.3    MCH      27.0 - 33.0 pg 25.8 (L)    MCHC      33.6 - 35.0 g/dL 29.9 (L)    RDW      35.9 - 50.0 fL 47.3    Platelet Count      164 - 446 K/uL 145 (L)    Hepatitis C Antibody      Non-Reactive  Non-Reactive    Hepatitis B Surface Antigen      Non-Reactive  Non-Reactive    Rubella IgG Antibody      IU/mL 11.50    Syphilis, Treponemal Qual      Non-Reactive  Non-Reactive    Urine Amphetamine-Methamphetam      Cutoff 300 ng/mL Negative    Barbiturates      Cutoff 200 ng/mL Negative    Benzodiazepines      Cutoff 200 ng/mL Negative    Propoxyphene      Cutoff 300 ng/mL Negative    Cocaine Metabolite      Cutoff 150 ng/mL Negative    Methadone      Cutoff 150 ng/mL Negative    Codeine-Morphine      Cutoff 300 ng/mL Negative    Phencyclidine -Pcp      Cutoff 25 ng/mL Negative    Cannabinoid Metab      Cutoff 20 ng/mL Negative    Drug Comment Urine Drugs See Note    Significant Indicator NEG    Source UR    Site Clean Catch    Culture Result Usual urogenital wilmar >100,000 cfu/mL    ABO Grouping Only A    Rh Grouping Only POS    Antibody Screen Scrn NEG    HIV Ag/Ab Combo Assay      Non Reactive  Non-Reactive         TDaP: Administered 2/15/23  GBS: collected today   Rh: positive  BTL: Declined 2/15/23    A/P:  17  y.o.  at 36w2d presents for routine obstetric follow-up.  Size equals dates and/or scan    - Reviewed PNL just completed showing anemia- strongly encouraged he rto get iron supplement today and to  PNV - pills if she can tolerate   - Fetal kick counts.  - Exercise at least 30 minutes daily. Drink at least 3-4L of water daily  - PTL precautions educated.    Follow-up in 1 week    Mariah Kessler P.A.-C.

## 2023-04-07 LAB — GP B STREP DNA SPEC QL NAA+PROBE: NEGATIVE

## 2023-04-11 ENCOUNTER — ROUTINE PRENATAL (OUTPATIENT)
Dept: OBGYN | Facility: CLINIC | Age: 18
End: 2023-04-11
Payer: MEDICAID

## 2023-04-11 VITALS — WEIGHT: 185 LBS | SYSTOLIC BLOOD PRESSURE: 100 MMHG | DIASTOLIC BLOOD PRESSURE: 74 MMHG | BODY MASS INDEX: 31.76 KG/M2

## 2023-04-11 DIAGNOSIS — Z34.03 SUPERVISION OF NORMAL FIRST PREGNANCY IN THIRD TRIMESTER: ICD-10-CM

## 2023-04-11 PROCEDURE — 0502F SUBSEQUENT PRENATAL CARE: CPT | Performed by: ADVANCED PRACTICE MIDWIFE

## 2023-04-11 PROCEDURE — 86780 TREPONEMA PALLIDUM: CPT

## 2023-04-11 PROCEDURE — 36415 COLL VENOUS BLD VENIPUNCTURE: CPT

## 2023-04-11 ASSESSMENT — FIBROSIS 4 INDEX: FIB4 SCORE: 0.47

## 2023-04-11 NOTE — PROGRESS NOTES
Pt here today for OB follow up  Pt states no complaints   Reports +FM  Good # [phone]  Pharmacy Confirmed.  Chaperone offered and not indicated.   GBS negative

## 2023-04-11 NOTE — PROGRESS NOTES
Patient here for MARY visit at 37w1d of pregnancy with FOB. She affirms fetal movement, denies vaginal bleeding or cramping/regular contractions. She reports overall today she is feeling well and without complaints. No recent ER visits since last seen. Adequate hydration reviewed in detail with patient. Precautions and warning signs reviewed with patient.  RTC in 1 week(s) for MARY visit.     Discussed with patient anxiety around childbirth. We discussed her fear of death in labor. I have recommended that we access a therapist in case of postpartum anxiety. We discussed labor support and concerning signs and symptoms in detail related to labor.

## 2023-04-18 ENCOUNTER — HOSPITAL ENCOUNTER (EMERGENCY)
Facility: MEDICAL CENTER | Age: 18
End: 2023-04-18
Attending: OBSTETRICS & GYNECOLOGY | Admitting: OBSTETRICS & GYNECOLOGY
Payer: MEDICAID

## 2023-04-18 ENCOUNTER — ROUTINE PRENATAL (OUTPATIENT)
Dept: OBGYN | Facility: CLINIC | Age: 18
End: 2023-04-18
Payer: MEDICAID

## 2023-04-18 VITALS — BODY MASS INDEX: 32.1 KG/M2 | WEIGHT: 187 LBS | SYSTOLIC BLOOD PRESSURE: 117 MMHG | DIASTOLIC BLOOD PRESSURE: 68 MMHG

## 2023-04-18 VITALS
DIASTOLIC BLOOD PRESSURE: 87 MMHG | WEIGHT: 187 LBS | HEART RATE: 88 BPM | TEMPERATURE: 97.4 F | RESPIRATION RATE: 16 BRPM | SYSTOLIC BLOOD PRESSURE: 132 MMHG | HEIGHT: 64 IN | BODY MASS INDEX: 31.92 KG/M2

## 2023-04-18 DIAGNOSIS — Z34.03 SUPERVISION OF NORMAL FIRST PREGNANCY IN THIRD TRIMESTER: ICD-10-CM

## 2023-04-18 PROCEDURE — 59025 FETAL NON-STRESS TEST: CPT

## 2023-04-18 PROCEDURE — 0502F SUBSEQUENT PRENATAL CARE: CPT | Performed by: ADVANCED PRACTICE MIDWIFE

## 2023-04-18 PROCEDURE — 99282 EMERGENCY DEPT VISIT SF MDM: CPT | Mod: 25

## 2023-04-18 PROCEDURE — 99282 EMERGENCY DEPT VISIT SF MDM: CPT | Mod: GC | Performed by: OBSTETRICS & GYNECOLOGY

## 2023-04-18 ASSESSMENT — PAIN SCALES - GENERAL: PAINLEVEL: 3

## 2023-04-18 ASSESSMENT — FIBROSIS 4 INDEX
FIB4 SCORE: 0.47
FIB4 SCORE: 0.47

## 2023-04-18 NOTE — PROGRESS NOTES
Patient here for MARY visit at 38w1d of pregnancy. She presents with her mom today. She affirms fetal movement, denies vaginal bleeding or cramping/regular contractions. She reports overall today she is feeling well and without complaints. She has noticed increased vaginal pressure and lower abdominal tightness. She is reporting today that she had sex last night. No recent ER visits since last seen. She would like to be checked.   SVE: 4/90/-1 posterior.  Discussed dilation in detail with patient and mother. She reports being surprised with this dilation. I discussed expectations related to harder labor. Her mother voices understanding.  Adequate hydration reviewed in detail with patient. Precautions and warning signs reviewed with patient.  RTC in 6 weeks for postpartum appointment.

## 2023-04-18 NOTE — PROGRESS NOTES
Pt here today for OB follow up  Pt states she would like to be checked  Reports +SUSANA Sorensen # [phone] (home)    Pharmacy Confirmed.  Chaperone offered and declined.

## 2023-04-19 ENCOUNTER — HOSPITAL ENCOUNTER (EMERGENCY)
Facility: MEDICAL CENTER | Age: 18
End: 2023-04-20
Attending: OBSTETRICS & GYNECOLOGY | Admitting: OBSTETRICS & GYNECOLOGY
Payer: MEDICAID

## 2023-04-19 PROCEDURE — 99283 EMERGENCY DEPT VISIT LOW MDM: CPT

## 2023-04-19 NOTE — ED PROVIDER NOTES
LABOR AND DELIVERY TRIAGE NOTE    PATIENT ID:  NAME:  Humaira Mejia  MRN:               [id number]  YOB: 2005     17 y.o. female  at 38w1d by 6 week US.    Subjective: Pt reports experiencing vaginal bleeding with mucous. She has been experiencing cramping that occurs a couple of times a day. Patient is unsure when the cramping started.    Pregnancy complicated by thrombocytopenia, anemia in pregnancy, history of anxiety anddepression.    positive for contractions  negative pain   negative for LOF  positive and negative for vaginal bleeding  positive for fetal movement    PNL:  Blood Type A+, Rubella immune, HIV neg, TrepAb neg, HBsAg NR, GC/CT neg/neg  1 HR GTT: 109  GBS -      ROS: Patient denies any fever chills, nausea, vomiting, headache, chest pain, shortness of breath, or dysuria or unusual swelling of hands or feet.     PMHx:   Past Medical History:   Diagnosis Date    Patient denies medical problems         OB History    Para Term  AB Living   1             SAB IAB Ectopic Molar Multiple Live Births                    # Outcome Date GA Lbr Lucio/2nd Weight Sex Delivery Anes PTL Lv   1 Current                GYN: denies STIs, no cervical procedures    PSHx:  No past surgical history on file.    Social Hx:  Social History     Tobacco Use    Smoking status: Never     Passive exposure: Yes    Smokeless tobacco: Never   Vaping Use    Vaping Use: Former    Substances: Nicotine    Devices: Disposable   Substance Use Topics    Alcohol use: No    Drug use: No         Medications:  No current facility-administered medications on file prior to encounter.     No current outpatient medications on file prior to encounter.       Allergies:  Allergies   Allergen Reactions    Horse Allergy Itching    Pollen Extract Runny Nose     Runny nose          Objective:    There were no vitals filed for this visit.  No data recorded.    General: No acute distress, resting comfortably in bed.  HEENT:  normocephalic, nontraumatic, PERRLA, EOMI  Cardiovascular: Heart RRR with no murmurs, rubs or gallops. Distal Pulses 2+  Respiratory: symmetric chest expansion, lungs CTAB, with no wheezes, rales, rhonci  Abdomen: gravid, nontender  Musculoskeletal: YAO spontaneously  Neuro: non focal with no numbness, tingling or changes in sensation    Cervix:  4cm/70%/-2  [place]: Infrequent Uterine Contractions  FHRM: Baseline 130, moderate variability, + accels, no decels    Labs:   None    Assessment: 17 y.o. female  at 38w1d presents with vaginal bleeding.    Plan:   - Category 1 tracing  - No change on cervical check at 1730 compared to 1100 today  - Patient is cleared to return home with family. Encouraged to see MD/DO for increased painful uterine contractions @ 3-5, vaginal bleeding, loss of fluid, or other serious symptoms.      Discussed case with Dr. Alaniz, [facility] Attending. Case was discussed and attending agreed with plan prior to discharge of patient.      Mary Ngo M.D.

## 2023-04-19 NOTE — PROGRESS NOTES
17 y.o.  @ 38.1 here to LDA1 with Mother Haleigh, c/o increased mucous discharge and mild uc;s. Pt reports positive fetal movement, denies vaginal bleeding or LOF. Pt was evaluated in clinic today SVE @ 11 am /  EFM & [place] applied. VSS.   Reactive NST obtained.   1730 SVE no change from previous exam /vetex mid-position.   Dr. Ngo at bedside, discharge order received. Term labor precautions given. Pt and Mother verbalize understanding. Discharged to home ambulatory.

## 2023-04-20 VITALS
RESPIRATION RATE: 18 BRPM | DIASTOLIC BLOOD PRESSURE: 90 MMHG | TEMPERATURE: 97.6 F | SYSTOLIC BLOOD PRESSURE: 126 MMHG | HEIGHT: 64 IN | HEART RATE: 77 BPM | BODY MASS INDEX: 31.92 KG/M2 | WEIGHT: 187 LBS | OXYGEN SATURATION: 97 %

## 2023-04-20 LAB
ALBUMIN SERPL BCP-MCNC: 3.5 G/DL (ref 3.2–4.9)
ALBUMIN/GLOB SERPL: 1.2 G/DL
ALP SERPL-CCNC: 147 U/L (ref 30–99)
ALT SERPL-CCNC: 5 U/L (ref 2–50)
ANION GAP SERPL CALC-SCNC: 11 MMOL/L (ref 7–16)
ANISOCYTOSIS BLD QL SMEAR: ABNORMAL
APPEARANCE UR: CLEAR
AST SERPL-CCNC: 10 U/L (ref 12–45)
BASOPHILS # BLD AUTO: 1.7 % (ref 0–1.8)
BASOPHILS # BLD: 0.13 K/UL (ref 0–0.12)
BILIRUB SERPL-MCNC: 0.3 MG/DL (ref 0.1–1.5)
BUN SERPL-MCNC: 10 MG/DL (ref 8–22)
CALCIUM ALBUM COR SERPL-MCNC: 8.9 MG/DL (ref 8.5–10.5)
CALCIUM SERPL-MCNC: 8.5 MG/DL (ref 8.5–10.5)
CHLORIDE SERPL-SCNC: 109 MMOL/L (ref 96–112)
CO2 SERPL-SCNC: 19 MMOL/L (ref 20–33)
COLOR UR AUTO: YELLOW
CREAT SERPL-MCNC: 0.62 MG/DL (ref 0.5–1.4)
CREAT UR-MCNC: 75.09 MG/DL
EOSINOPHIL # BLD AUTO: 0.14 K/UL (ref 0–0.51)
EOSINOPHIL NFR BLD: 1.8 % (ref 0–6.9)
ERYTHROCYTE [DISTWIDTH] IN BLOOD BY AUTOMATED COUNT: 48.6 FL (ref 35.9–50)
GFR SERPLBLD CREATININE-BSD FMLA CKD-EPI: 131 ML/MIN/1.73 M 2
GLOBULIN SER CALC-MCNC: 2.9 G/DL (ref 1.9–3.5)
GLUCOSE SERPL-MCNC: 72 MG/DL (ref 65–99)
GLUCOSE UR QL STRIP.AUTO: NEGATIVE MG/DL
HCT VFR BLD AUTO: 32.1 % (ref 37–47)
HGB BLD-MCNC: 9.6 G/DL (ref 12–16)
HYPOCHROMIA BLD QL SMEAR: ABNORMAL
KETONES UR QL STRIP.AUTO: NEGATIVE MG/DL
LEUKOCYTE ESTERASE UR QL STRIP.AUTO: ABNORMAL
LYMPHOCYTES # BLD AUTO: 1.74 K/UL (ref 1–4.8)
LYMPHOCYTES NFR BLD: 22.6 % (ref 22–41)
MANUAL DIFF BLD: NORMAL
MCH RBC QN AUTO: 25.2 PG (ref 27–33)
MCHC RBC AUTO-ENTMCNC: 29.9 G/DL (ref 33.6–35)
MCV RBC AUTO: 84.3 FL (ref 81.4–97.8)
MICROCYTES BLD QL SMEAR: ABNORMAL
MONOCYTES # BLD AUTO: 0.27 K/UL (ref 0–0.85)
MONOCYTES NFR BLD AUTO: 3.5 % (ref 0–13.4)
MORPHOLOGY BLD-IMP: NORMAL
NEUTROPHILS # BLD AUTO: 5.42 K/UL (ref 2–7.15)
NEUTROPHILS NFR BLD: 70.4 % (ref 44–72)
NITRITE UR QL STRIP.AUTO: NEGATIVE
NRBC # BLD AUTO: 0.02 K/UL
NRBC BLD-RTO: 0.3 /100 WBC
PH UR STRIP.AUTO: 6.5 [PH] (ref 5–8)
PLATELET # BLD AUTO: 147 K/UL (ref 164–446)
PLATELET BLD QL SMEAR: NORMAL
POTASSIUM SERPL-SCNC: 3.9 MMOL/L (ref 3.6–5.5)
PROT SERPL-MCNC: 6.4 G/DL (ref 6–8.2)
PROT UR QL STRIP: NEGATIVE MG/DL
PROT UR-MCNC: 16 MG/DL (ref 0–15)
PROT/CREAT UR: 213 MG/G (ref 10–107)
RBC # BLD AUTO: 3.81 M/UL (ref 4.2–5.4)
RBC BLD AUTO: PRESENT
RBC UR QL AUTO: ABNORMAL
SODIUM SERPL-SCNC: 139 MMOL/L (ref 135–145)
SP GR UR STRIP.AUTO: 1.02 (ref 1–1.03)
WBC # BLD AUTO: 7.7 K/UL (ref 4.8–10.8)

## 2023-04-20 PROCEDURE — 36415 COLL VENOUS BLD VENIPUNCTURE: CPT

## 2023-04-20 PROCEDURE — 59025 FETAL NON-STRESS TEST: CPT | Mod: 26 | Performed by: NURSE PRACTITIONER

## 2023-04-20 PROCEDURE — 82570 ASSAY OF URINE CREATININE: CPT

## 2023-04-20 PROCEDURE — 84156 ASSAY OF PROTEIN URINE: CPT

## 2023-04-20 PROCEDURE — 80053 COMPREHEN METABOLIC PANEL: CPT

## 2023-04-20 PROCEDURE — 85025 COMPLETE CBC W/AUTO DIFF WBC: CPT

## 2023-04-20 PROCEDURE — 99283 EMERGENCY DEPT VISIT LOW MDM: CPT | Mod: 25 | Performed by: NURSE PRACTITIONER

## 2023-04-20 PROCEDURE — 81002 URINALYSIS NONAUTO W/O SCOPE: CPT

## 2023-04-20 PROCEDURE — 85007 BL SMEAR W/DIFF WBC COUNT: CPT

## 2023-04-20 PROCEDURE — 59025 FETAL NON-STRESS TEST: CPT

## 2023-04-20 ASSESSMENT — FIBROSIS 4 INDEX: FIB4 SCORE: 0.47

## 2023-04-20 NOTE — PROGRESS NOTES
, due , 38w3d    2352 Pt presents to L&D with c/o nausea and vomiting. Pt reports the last time she threw up was early this morning and does not feel nauseous right now. Pt also reports some stomach pain that is constant like she is sick. Pt denies feeling any contractions, no LOF, some intermittent spotting and reports +FM. EFM & TOCO applied. Initial BPs 135-98, repeat 139/89.     0037 Call to RUTH Roque CNM. Report given. Orders for PIH labs and recheck in one hour.     0135 SVE as charted. Call to RUTH Juares CNM. Report given. Will come to bedside.     0145 RUTH Juares CNM at bedside. Orders to d/c pt received. Pt to call office in the morning and request a BP check on Friday.    0155 L&D d/c instructions given to pt and family member. Pt verbalized understanding and all questions answered. Pt d/c to self with family member.

## 2023-04-20 NOTE — ED PROVIDER NOTES
PATIENT ID:  NAME:  Humaira Mejia  MRN:               [id number]  YOB: 2005     17 y.o. female  at 38w3d.    Subjective: Pt reports CTXs    Pregnancy complicated by anemia, thrombocytopenia    hx of depression and anxiety, medication overdose    positive  For CTXS.   positive Feels pain   negative for LOF  negative for vaginal bleeding.   positive for fetal movement    ROS: Patient denies any fever chills, nausea, vomiting, headache, chest pain, shortness of breath, or dysuria or unusual swelling of hands or feet.     Objective:    Vitals:    23 0012 23 0028 23 0042 23 0058   BP: 139/89 130/89 (!) 129/90 (!) 131/91   Pulse: 82 83 87 83   Resp:       Temp:       TempSrc:       SpO2:       Weight:       Height:         Temp (24hrs), Av.4 °C (97.6 °F), Min:36.4 °C (97.6 °F), Max:36.4 °C (97.6 °F)    General: No acute distress, resting comfortably in bed.  HEENT: normocephalic, nontraumatic, PERRLA, EOMI  Cardiovascular: Heart RRR with no murmurs, rubs or gallops. Distal Pulses 2+  Respiratory: symmetric chest expansion, lungs CTAB, with no wheezes, rales, rhonci  Abdomen: gravid, nontender  Musculoskeletal: strength 5/5 in four extremities  Neuro: non focal with no numbness, tingling or changes in sensation    Cervix:  4cm/70%/-2, unchanged from previous exam  [place]: Uterine Contractions Q 2-3 minutes.   FHRM: Baseline 125, Accels to 160, no decels, moderate variabiliy    Mildly elevated BPs, PIH labs sent:   Latest Reference Range & Units 23 00:54   WBC 4.8 - 10.8 K/uL 7.7   RBC 4.20 - 5.40 M/uL 3.81 (L)   Hemoglobin 12.0 - 16.0 g/dL 9.6 (L)   Hematocrit 37.0 - 47.0 % 32.1 (L)   MCV 81.4 - 97.8 fL 84.3   MCH 27.0 - 33.0 pg 25.2 (L)   MCHC 33.6 - 35.0 g/dL 29.9 (L)   RDW 35.9 - 50.0 fL 48.6   Platelet Count 164 - 446 K/uL 147 (L)   Neutrophils-Polys 44.00 - 72.00 % 70.40   Neutrophils (Absolute) 2.00 - 7.15 K/uL 5.42   Lymphocytes 22.00 - 41.00 % 22.60   Lymphs  (Absolute) 1.00 - 4.80 K/uL 1.74   Monocytes 0.00 - 13.40 % 3.50   Monos (Absolute) 0.00 - 0.85 K/uL 0.27   Eosinophils 0.00 - 6.90 % 1.80   Eos (Absolute) 0.00 - 0.51 K/uL 0.14   Basophils 0.00 - 1.80 % 1.70   Baso (Absolute) 0.00 - 0.12 K/uL 0.13 (H)   Nucleated RBC /100 WBC 0.30   NRBC (Absolute) K/uL 0.02   Plt Estimation  Normal   RBC Morphology  Present   Hypochromia  1+   Anisocytosis  1+   Microcytosis  1+   Peripheral Smear Review  see below   Manual Diff Status  PERFORMED   Sodium 135 - 145 mmol/L 139   Potassium 3.6 - 5.5 mmol/L 3.9   Chloride 96 - 112 mmol/L 109   Co2 20 - 33 mmol/L 19 (L)   Anion Gap 7.0 - 16.0  11.0   Glucose 65 - 99 mg/dL 72   Bun 8 - 22 mg/dL 10   Creatinine 0.50 - 1.40 mg/dL 0.62   GFR (CKD-EPI) >60 mL/min/1.73 m 2 131   Calcium 8.5 - 10.5 mg/dL 8.5   Correct Calcium 8.5 - 10.5 mg/dL 8.9   AST(SGOT) 12 - 45 U/L 10 (L)   ALT(SGPT) 2 - 50 U/L 5   Alkaline Phosphatase 30 - 99 U/L 147 (H)   Total Bilirubin 0.1 - 1.5 mg/dL 0.3   Albumin 3.2 - 4.9 g/dL 3.5   Total Protein 6.0 - 8.2 g/dL 6.4   Globulin 1.9 - 3.5 g/dL 2.9   A-G Ratio g/dL 1.2       Assessment: 17 y.o. female  at 38w3d.    NST reactive per my read    Plan:   1. Patient is cleared to return home.   2. F/U in office on Friday for BP check. Pt's mother states she has a BP cuff at home and will be checking for BPs >140/90  Pt has 39 wk IOL scheduled   3. Instructions for labor given

## 2023-04-21 ENCOUNTER — NON-PROVIDER VISIT (OUTPATIENT)
Dept: OBGYN | Facility: CLINIC | Age: 18
End: 2023-04-21
Payer: COMMERCIAL

## 2023-04-21 ENCOUNTER — HOSPITAL ENCOUNTER (INPATIENT)
Facility: MEDICAL CENTER | Age: 18
LOS: 3 days | End: 2023-04-24
Attending: OBSTETRICS & GYNECOLOGY | Admitting: OBSTETRICS & GYNECOLOGY
Payer: MEDICAID

## 2023-04-21 VITALS
WEIGHT: 189.8 LBS | SYSTOLIC BLOOD PRESSURE: 122 MMHG | DIASTOLIC BLOOD PRESSURE: 78 MMHG | BODY MASS INDEX: 32.4 KG/M2 | HEIGHT: 64 IN

## 2023-04-21 PROBLEM — K44.9 HIATAL HERNIA: Status: ACTIVE | Noted: 2023-04-21

## 2023-04-21 LAB
ALBUMIN SERPL BCP-MCNC: 3.4 G/DL (ref 3.2–4.9)
ALBUMIN/GLOB SERPL: 1 G/DL
ALP SERPL-CCNC: 160 U/L (ref 30–99)
ALT SERPL-CCNC: 9 U/L (ref 2–50)
ANION GAP SERPL CALC-SCNC: 14 MMOL/L (ref 7–16)
ANISOCYTOSIS BLD QL SMEAR: ABNORMAL
APPEARANCE UR: CLEAR
AST SERPL-CCNC: 15 U/L (ref 12–45)
BACTERIA #/AREA URNS HPF: ABNORMAL /HPF
BASOPHILS # BLD AUTO: 0 % (ref 0–1.8)
BASOPHILS # BLD: 0 K/UL (ref 0–0.12)
BILIRUB SERPL-MCNC: 0.3 MG/DL (ref 0.1–1.5)
BILIRUB UR QL STRIP.AUTO: NEGATIVE
BUN SERPL-MCNC: 13 MG/DL (ref 8–22)
CALCIUM ALBUM COR SERPL-MCNC: 9.4 MG/DL (ref 8.5–10.5)
CALCIUM SERPL-MCNC: 8.9 MG/DL (ref 8.5–10.5)
CHLORIDE SERPL-SCNC: 106 MMOL/L (ref 96–112)
CO2 SERPL-SCNC: 18 MMOL/L (ref 20–33)
COLOR UR: YELLOW
CREAT SERPL-MCNC: 0.63 MG/DL (ref 0.5–1.4)
CREAT UR-MCNC: 66.67 MG/DL
EOSINOPHIL # BLD AUTO: 0.27 K/UL (ref 0–0.51)
EOSINOPHIL NFR BLD: 3.5 % (ref 0–6.9)
EPI CELLS #/AREA URNS HPF: ABNORMAL /HPF
ERYTHROCYTE [DISTWIDTH] IN BLOOD BY AUTOMATED COUNT: 48.4 FL (ref 35.9–50)
GFR SERPLBLD CREATININE-BSD FMLA CKD-EPI: 131 ML/MIN/1.73 M 2
GIANT PLATELETS BLD QL SMEAR: NORMAL
GLOBULIN SER CALC-MCNC: 3.4 G/DL (ref 1.9–3.5)
GLUCOSE SERPL-MCNC: 71 MG/DL (ref 65–99)
GLUCOSE UR STRIP.AUTO-MCNC: NEGATIVE MG/DL
HCT VFR BLD AUTO: 34.4 % (ref 37–47)
HGB BLD-MCNC: 10.8 G/DL (ref 12–16)
KETONES UR STRIP.AUTO-MCNC: NEGATIVE MG/DL
LEUKOCYTE ESTERASE UR QL STRIP.AUTO: ABNORMAL
LYMPHOCYTES # BLD AUTO: 1.64 K/UL (ref 1–4.8)
LYMPHOCYTES NFR BLD: 21 % (ref 22–41)
MANUAL DIFF BLD: NORMAL
MCH RBC QN AUTO: 26.2 PG (ref 27–33)
MCHC RBC AUTO-ENTMCNC: 31.4 G/DL (ref 33.6–35)
MCV RBC AUTO: 83.3 FL (ref 81.4–97.8)
METAMYELOCYTES NFR BLD MANUAL: 0.9 %
MICRO URNS: ABNORMAL
MICROCYTES BLD QL SMEAR: ABNORMAL
MONOCYTES # BLD AUTO: 0 K/UL (ref 0–0.85)
MONOCYTES NFR BLD AUTO: 0 % (ref 0–13.4)
MORPHOLOGY BLD-IMP: NORMAL
MYELOCYTES NFR BLD MANUAL: 0.9 %
NEUTROPHILS # BLD AUTO: 5.75 K/UL (ref 2–7.15)
NEUTROPHILS NFR BLD: 73.7 % (ref 44–72)
NITRITE UR QL STRIP.AUTO: NEGATIVE
NRBC # BLD AUTO: 0.02 K/UL
NRBC BLD-RTO: 0.3 /100 WBC
PH UR STRIP.AUTO: 7 [PH] (ref 5–8)
PLATELET # BLD AUTO: 120 K/UL (ref 164–446)
PLATELET BLD QL SMEAR: NORMAL
POLYCHROMASIA BLD QL SMEAR: NORMAL
POTASSIUM SERPL-SCNC: 3.9 MMOL/L (ref 3.6–5.5)
PROT SERPL-MCNC: 6.8 G/DL (ref 6–8.2)
PROT UR QL STRIP: NEGATIVE MG/DL
PROT UR-MCNC: 16 MG/DL (ref 0–15)
PROT/CREAT UR: 240 MG/G (ref 10–107)
RBC # BLD AUTO: 4.13 M/UL (ref 4.2–5.4)
RBC # URNS HPF: ABNORMAL /HPF
RBC BLD AUTO: PRESENT
RBC UR QL AUTO: ABNORMAL
SODIUM SERPL-SCNC: 138 MMOL/L (ref 135–145)
SP GR UR STRIP.AUTO: 1.02
UROBILINOGEN UR STRIP.AUTO-MCNC: 0.2 MG/DL
WBC # BLD AUTO: 7.8 K/UL (ref 4.8–10.8)
WBC #/AREA URNS HPF: ABNORMAL /HPF

## 2023-04-21 PROCEDURE — 36415 COLL VENOUS BLD VENIPUNCTURE: CPT

## 2023-04-21 PROCEDURE — 770002 HCHG ROOM/CARE - OB PRIVATE (112)

## 2023-04-21 PROCEDURE — 84156 ASSAY OF PROTEIN URINE: CPT

## 2023-04-21 PROCEDURE — 80053 COMPREHEN METABOLIC PANEL: CPT

## 2023-04-21 PROCEDURE — 85025 COMPLETE CBC W/AUTO DIFF WBC: CPT

## 2023-04-21 PROCEDURE — 85007 BL SMEAR W/DIFF WBC COUNT: CPT

## 2023-04-21 PROCEDURE — 81001 URINALYSIS AUTO W/SCOPE: CPT

## 2023-04-21 PROCEDURE — 99283 EMERGENCY DEPT VISIT LOW MDM: CPT

## 2023-04-21 PROCEDURE — 86780 TREPONEMA PALLIDUM: CPT

## 2023-04-21 PROCEDURE — 82570 ASSAY OF URINE CREATININE: CPT

## 2023-04-21 RX ORDER — LIDOCAINE HYDROCHLORIDE 10 MG/ML
20 INJECTION, SOLUTION INFILTRATION; PERINEURAL
Status: DISCONTINUED | OUTPATIENT
Start: 2023-04-21 | End: 2023-04-22 | Stop reason: HOSPADM

## 2023-04-21 RX ORDER — LABETALOL HYDROCHLORIDE 5 MG/ML
20-80 INJECTION, SOLUTION INTRAVENOUS
Status: DISCONTINUED | OUTPATIENT
Start: 2023-04-21 | End: 2023-04-22 | Stop reason: HOSPADM

## 2023-04-21 RX ORDER — ONDANSETRON 2 MG/ML
4 INJECTION INTRAMUSCULAR; INTRAVENOUS EVERY 6 HOURS PRN
Status: DISCONTINUED | OUTPATIENT
Start: 2023-04-21 | End: 2023-04-22 | Stop reason: HOSPADM

## 2023-04-21 RX ORDER — ALUMINA, MAGNESIA, AND SIMETHICONE 2400; 2400; 240 MG/30ML; MG/30ML; MG/30ML
30 SUSPENSION ORAL EVERY 6 HOURS PRN
Status: DISCONTINUED | OUTPATIENT
Start: 2023-04-21 | End: 2023-04-22 | Stop reason: HOSPADM

## 2023-04-21 RX ORDER — DEXTROSE, SODIUM CHLORIDE, SODIUM LACTATE, POTASSIUM CHLORIDE, AND CALCIUM CHLORIDE 5; .6; .31; .03; .02 G/100ML; G/100ML; G/100ML; G/100ML; G/100ML
INJECTION, SOLUTION INTRAVENOUS CONTINUOUS
Status: DISCONTINUED | OUTPATIENT
Start: 2023-04-22 | End: 2023-04-22 | Stop reason: HOSPADM

## 2023-04-21 RX ORDER — CARBOPROST TROMETHAMINE 250 UG/ML
250 INJECTION, SOLUTION INTRAMUSCULAR
Status: DISCONTINUED | OUTPATIENT
Start: 2023-04-21 | End: 2023-04-22 | Stop reason: HOSPADM

## 2023-04-21 RX ORDER — TERBUTALINE SULFATE 1 MG/ML
0.25 INJECTION, SOLUTION SUBCUTANEOUS
Status: DISCONTINUED | OUTPATIENT
Start: 2023-04-21 | End: 2023-04-22 | Stop reason: HOSPADM

## 2023-04-21 RX ORDER — ACETAMINOPHEN 500 MG
1000 TABLET ORAL
Status: DISCONTINUED | OUTPATIENT
Start: 2023-04-21 | End: 2023-04-22 | Stop reason: HOSPADM

## 2023-04-21 RX ORDER — IBUPROFEN 800 MG/1
800 TABLET ORAL
Status: DISCONTINUED | OUTPATIENT
Start: 2023-04-21 | End: 2023-04-22 | Stop reason: HOSPADM

## 2023-04-21 RX ORDER — HYDRALAZINE HYDROCHLORIDE 20 MG/ML
5-10 INJECTION INTRAMUSCULAR; INTRAVENOUS
Status: DISCONTINUED | OUTPATIENT
Start: 2023-04-21 | End: 2023-04-22 | Stop reason: HOSPADM

## 2023-04-21 RX ORDER — SODIUM CHLORIDE, SODIUM LACTATE, POTASSIUM CHLORIDE, CALCIUM CHLORIDE 600; 310; 30; 20 MG/100ML; MG/100ML; MG/100ML; MG/100ML
1000 INJECTION, SOLUTION INTRAVENOUS CONTINUOUS
Status: DISCONTINUED | OUTPATIENT
Start: 2023-04-21 | End: 2023-04-22 | Stop reason: HOSPADM

## 2023-04-21 RX ORDER — MISOPROSTOL 200 UG/1
800 TABLET ORAL
Status: DISCONTINUED | OUTPATIENT
Start: 2023-04-21 | End: 2023-04-22 | Stop reason: HOSPADM

## 2023-04-21 RX ORDER — OXYTOCIN 10 [USP'U]/ML
10 INJECTION, SOLUTION INTRAMUSCULAR; INTRAVENOUS
Status: DISCONTINUED | OUTPATIENT
Start: 2023-04-21 | End: 2023-04-22 | Stop reason: HOSPADM

## 2023-04-21 RX ORDER — ONDANSETRON 4 MG/1
4 TABLET, ORALLY DISINTEGRATING ORAL EVERY 6 HOURS PRN
Status: DISCONTINUED | OUTPATIENT
Start: 2023-04-21 | End: 2023-04-22 | Stop reason: HOSPADM

## 2023-04-21 RX ORDER — NIFEDIPINE 10 MG/1
10 CAPSULE ORAL
Status: DISCONTINUED | OUTPATIENT
Start: 2023-04-21 | End: 2023-04-22 | Stop reason: HOSPADM

## 2023-04-21 ASSESSMENT — LIFESTYLE VARIABLES: EVER_SMOKED: NEVER

## 2023-04-21 ASSESSMENT — FIBROSIS 4 INDEX: FIB4 SCORE: 0.58

## 2023-04-21 ASSESSMENT — PAIN SCALES - GENERAL: PAINLEVEL: 0 - NO PAIN

## 2023-04-21 NOTE — PROGRESS NOTES
Pt here today for a BP check  Good # [phone]  Reports +FM  Pt states she checked her BP at home yesterday and it was 105/72  Pt denies headaches or visual changes today  IOL scheduled for 04/28/2023. Pt informed and instructions given today.    BP today in office 122/78. Jil Juares CNM informed.    Per Jil Juares CNM Pt instructed to check her BP everyday if at any time the BP is 140/90 or higher or if pt is experiencing headaches, blurry vision, any visual changes or epigastric pain pt is to go to L&D. Pt verbalized understanding and will comply with instructions.

## 2023-04-22 ENCOUNTER — ANESTHESIA (OUTPATIENT)
Dept: ANESTHESIOLOGY | Facility: MEDICAL CENTER | Age: 18
End: 2023-04-22
Payer: MEDICAID

## 2023-04-22 ENCOUNTER — ANESTHESIA EVENT (OUTPATIENT)
Dept: ANESTHESIOLOGY | Facility: MEDICAL CENTER | Age: 18
End: 2023-04-22
Payer: MEDICAID

## 2023-04-22 LAB
ERYTHROCYTE [DISTWIDTH] IN BLOOD BY AUTOMATED COUNT: 47.7 FL (ref 35.9–50)
HCT VFR BLD AUTO: 25.1 % (ref 37–47)
HGB BLD-MCNC: 7.9 G/DL (ref 12–16)
HOLDING TUBE BB 8507: NORMAL
MCH RBC QN AUTO: 25.7 PG (ref 27–33)
MCHC RBC AUTO-ENTMCNC: 31.5 G/DL (ref 33.6–35)
MCV RBC AUTO: 81.8 FL (ref 81.4–97.8)
PLATELET # BLD AUTO: 105 K/UL (ref 164–446)
RBC # BLD AUTO: 3.07 M/UL (ref 4.2–5.4)
T PALLIDUM AB SER QL IA: NORMAL
WBC # BLD AUTO: 10.4 K/UL (ref 4.8–10.8)

## 2023-04-22 PROCEDURE — 770002 HCHG ROOM/CARE - OB PRIVATE (112)

## 2023-04-22 PROCEDURE — 01967 NEURAXL LBR ANES VAG DLVR: CPT | Performed by: ANESTHESIOLOGY

## 2023-04-22 PROCEDURE — 700111 HCHG RX REV CODE 636 W/ 250 OVERRIDE (IP): Performed by: ANESTHESIOLOGY

## 2023-04-22 PROCEDURE — 303615 HCHG EPIDURAL/SPINAL ANESTHESIA FOR LABOR

## 2023-04-22 PROCEDURE — 59400 OBSTETRICAL CARE: CPT | Performed by: NURSE PRACTITIONER

## 2023-04-22 PROCEDURE — A9270 NON-COVERED ITEM OR SERVICE: HCPCS | Performed by: NURSE PRACTITIONER

## 2023-04-22 PROCEDURE — 0UQMXZZ REPAIR VULVA, EXTERNAL APPROACH: ICD-10-PCS | Performed by: OBSTETRICS & GYNECOLOGY

## 2023-04-22 PROCEDURE — 700102 HCHG RX REV CODE 250 W/ 637 OVERRIDE(OP)

## 2023-04-22 PROCEDURE — A9270 NON-COVERED ITEM OR SERVICE: HCPCS

## 2023-04-22 PROCEDURE — 700101 HCHG RX REV CODE 250: Performed by: NURSE PRACTITIONER

## 2023-04-22 PROCEDURE — 304965 HCHG RECOVERY SERVICES

## 2023-04-22 PROCEDURE — 700102 HCHG RX REV CODE 250 W/ 637 OVERRIDE(OP): Performed by: NURSE PRACTITIONER

## 2023-04-22 PROCEDURE — 85027 COMPLETE CBC AUTOMATED: CPT

## 2023-04-22 PROCEDURE — 36415 COLL VENOUS BLD VENIPUNCTURE: CPT

## 2023-04-22 PROCEDURE — 0UQGXZZ REPAIR VAGINA, EXTERNAL APPROACH: ICD-10-PCS | Performed by: OBSTETRICS & GYNECOLOGY

## 2023-04-22 PROCEDURE — 700105 HCHG RX REV CODE 258: Performed by: NURSE PRACTITIONER

## 2023-04-22 PROCEDURE — 0HQ9XZZ REPAIR PERINEUM SKIN, EXTERNAL APPROACH: ICD-10-PCS | Performed by: OBSTETRICS & GYNECOLOGY

## 2023-04-22 PROCEDURE — 59409 OBSTETRICAL CARE: CPT

## 2023-04-22 PROCEDURE — 3E033VJ INTRODUCTION OF OTHER HORMONE INTO PERIPHERAL VEIN, PERCUTANEOUS APPROACH: ICD-10-PCS | Performed by: OBSTETRICS & GYNECOLOGY

## 2023-04-22 PROCEDURE — 700111 HCHG RX REV CODE 636 W/ 250 OVERRIDE (IP): Performed by: NURSE PRACTITIONER

## 2023-04-22 PROCEDURE — 10907ZC DRAINAGE OF AMNIOTIC FLUID, THERAPEUTIC FROM PRODUCTS OF CONCEPTION, VIA NATURAL OR ARTIFICIAL OPENING: ICD-10-PCS | Performed by: OBSTETRICS & GYNECOLOGY

## 2023-04-22 PROCEDURE — 700111 HCHG RX REV CODE 636 W/ 250 OVERRIDE (IP)

## 2023-04-22 PROCEDURE — 700101 HCHG RX REV CODE 250: Performed by: ANESTHESIOLOGY

## 2023-04-22 RX ORDER — ACETAMINOPHEN 500 MG
1000 TABLET ORAL EVERY 6 HOURS PRN
Status: DISCONTINUED | OUTPATIENT
Start: 2023-04-22 | End: 2023-04-24 | Stop reason: HOSPADM

## 2023-04-22 RX ORDER — SODIUM CHLORIDE, SODIUM LACTATE, POTASSIUM CHLORIDE, AND CALCIUM CHLORIDE .6; .31; .03; .02 G/100ML; G/100ML; G/100ML; G/100ML
250 INJECTION, SOLUTION INTRAVENOUS PRN
Status: DISCONTINUED | OUTPATIENT
Start: 2023-04-22 | End: 2023-04-22 | Stop reason: HOSPADM

## 2023-04-22 RX ORDER — ROPIVACAINE HYDROCHLORIDE 2 MG/ML
INJECTION, SOLUTION EPIDURAL; INFILTRATION; PERINEURAL
Status: COMPLETED
Start: 2023-04-22 | End: 2023-04-22

## 2023-04-22 RX ORDER — SODIUM CHLORIDE, SODIUM LACTATE, POTASSIUM CHLORIDE, AND CALCIUM CHLORIDE .6; .31; .03; .02 G/100ML; G/100ML; G/100ML; G/100ML
1000 INJECTION, SOLUTION INTRAVENOUS
Status: DISCONTINUED | OUTPATIENT
Start: 2023-04-22 | End: 2023-04-22 | Stop reason: HOSPADM

## 2023-04-22 RX ORDER — DOCUSATE SODIUM 100 MG/1
100 CAPSULE, LIQUID FILLED ORAL 2 TIMES DAILY PRN
Status: DISCONTINUED | OUTPATIENT
Start: 2023-04-22 | End: 2023-04-24 | Stop reason: HOSPADM

## 2023-04-22 RX ORDER — EPHEDRINE SULFATE 50 MG/ML
5 INJECTION, SOLUTION INTRAVENOUS
Status: DISCONTINUED | OUTPATIENT
Start: 2023-04-22 | End: 2023-04-22 | Stop reason: HOSPADM

## 2023-04-22 RX ORDER — BUPIVACAINE HYDROCHLORIDE 2.5 MG/ML
INJECTION, SOLUTION EPIDURAL; INFILTRATION; INTRACAUDAL PRN
Status: DISCONTINUED | OUTPATIENT
Start: 2023-04-22 | End: 2023-04-22 | Stop reason: SURG

## 2023-04-22 RX ORDER — ROPIVACAINE HYDROCHLORIDE 2 MG/ML
INJECTION, SOLUTION EPIDURAL; INFILTRATION; PERINEURAL CONTINUOUS
Status: DISCONTINUED | OUTPATIENT
Start: 2023-04-22 | End: 2023-04-22 | Stop reason: HOSPADM

## 2023-04-22 RX ORDER — BUPIVACAINE HYDROCHLORIDE 2.5 MG/ML
INJECTION, SOLUTION EPIDURAL; INFILTRATION; INTRACAUDAL
Status: COMPLETED
Start: 2023-04-22 | End: 2023-04-22

## 2023-04-22 RX ORDER — CALCIUM CARBONATE 500 MG/1
1000 TABLET, CHEWABLE ORAL EVERY 6 HOURS PRN
Status: DISCONTINUED | OUTPATIENT
Start: 2023-04-22 | End: 2023-04-24 | Stop reason: HOSPADM

## 2023-04-22 RX ORDER — IBUPROFEN 800 MG/1
800 TABLET ORAL EVERY 8 HOURS PRN
Status: DISCONTINUED | OUTPATIENT
Start: 2023-04-22 | End: 2023-04-24 | Stop reason: HOSPADM

## 2023-04-22 RX ORDER — SODIUM CHLORIDE, SODIUM LACTATE, POTASSIUM CHLORIDE, CALCIUM CHLORIDE 600; 310; 30; 20 MG/100ML; MG/100ML; MG/100ML; MG/100ML
INJECTION, SOLUTION INTRAVENOUS PRN
Status: DISCONTINUED | OUTPATIENT
Start: 2023-04-22 | End: 2023-04-24 | Stop reason: HOSPADM

## 2023-04-22 RX ORDER — LIDOCAINE HYDROCHLORIDE AND EPINEPHRINE 15; 5 MG/ML; UG/ML
INJECTION, SOLUTION EPIDURAL
Status: COMPLETED | OUTPATIENT
Start: 2023-04-22 | End: 2023-04-22

## 2023-04-22 RX ORDER — VITAMIN A ACETATE, BETA CAROTENE, ASCORBIC ACID, CHOLECALCIFEROL, .ALPHA.-TOCOPHEROL ACETATE, DL-, THIAMINE MONONITRATE, RIBOFLAVIN, NIACINAMIDE, PYRIDOXINE HYDROCHLORIDE, FOLIC ACID, CYANOCOBALAMIN, CALCIUM CARBONATE, FERROUS FUMARATE, ZINC OXIDE, CUPRIC OXIDE 3080; 12; 120; 400; 1; 1.84; 3; 20; 22; 920; 25; 200; 27; 10; 2 [IU]/1; UG/1; MG/1; [IU]/1; MG/1; MG/1; MG/1; MG/1; MG/1; [IU]/1; MG/1; MG/1; MG/1; MG/1; MG/1
1 TABLET, FILM COATED ORAL
Status: DISCONTINUED | OUTPATIENT
Start: 2023-04-22 | End: 2023-04-24 | Stop reason: HOSPADM

## 2023-04-22 RX ORDER — FERROUS SULFATE 325(65) MG
325 TABLET ORAL
Status: DISCONTINUED | OUTPATIENT
Start: 2023-04-22 | End: 2023-04-24 | Stop reason: HOSPADM

## 2023-04-22 RX ORDER — MISOPROSTOL 200 UG/1
600 TABLET ORAL
Status: DISCONTINUED | OUTPATIENT
Start: 2023-04-22 | End: 2023-04-24 | Stop reason: HOSPADM

## 2023-04-22 RX ADMIN — SODIUM CHLORIDE, POTASSIUM CHLORIDE, SODIUM LACTATE AND CALCIUM CHLORIDE 1000 ML: 600; 310; 30; 20 INJECTION, SOLUTION INTRAVENOUS at 00:38

## 2023-04-22 RX ADMIN — ROPIVACAINE HYDROCHLORIDE: 2 INJECTION, SOLUTION EPIDURAL; INFILTRATION at 03:51

## 2023-04-22 RX ADMIN — OXYTOCIN 125 ML/HR: 10 INJECTION, SOLUTION INTRAMUSCULAR; INTRAVENOUS at 09:00

## 2023-04-22 RX ADMIN — BUPIVACAINE HYDROCHLORIDE 6 ML: 2.5 INJECTION, SOLUTION EPIDURAL; INFILTRATION; INTRACAUDAL; PERINEURAL at 03:50

## 2023-04-22 RX ADMIN — ROPIVACAINE HYDROCHLORIDE: 2 INJECTION, SOLUTION EPIDURAL; INFILTRATION; PERINEURAL at 03:51

## 2023-04-22 RX ADMIN — FERROUS SULFATE TAB 325 MG (65 MG ELEMENTAL FE) 325 MG: 325 (65 FE) TAB at 18:35

## 2023-04-22 RX ADMIN — ACETAMINOPHEN 1000 MG: 500 TABLET, FILM COATED ORAL at 22:54

## 2023-04-22 RX ADMIN — FENTANYL CITRATE 100 MCG: 50 INJECTION, SOLUTION INTRAMUSCULAR; INTRAVENOUS at 03:50

## 2023-04-22 RX ADMIN — LIDOCAINE HYDROCHLORIDE,EPINEPHRINE BITARTRATE 3 ML: 15; .005 INJECTION, SOLUTION EPIDURAL; INFILTRATION; INTRACAUDAL; PERINEURAL at 03:47

## 2023-04-22 RX ADMIN — LIDOCAINE HYDROCHLORIDE 20 ML: 10 INJECTION, SOLUTION INFILTRATION; PERINEURAL at 05:48

## 2023-04-22 RX ADMIN — DOCUSATE SODIUM 100 MG: 100 CAPSULE, LIQUID FILLED ORAL at 18:35

## 2023-04-22 RX ADMIN — PRENATAL WITH FERROUS FUM AND FOLIC ACID 1 TABLET: 3080; 920; 120; 400; 22; 1.84; 3; 20; 10; 1; 12; 200; 27; 25; 2 TABLET ORAL at 08:57

## 2023-04-22 RX ADMIN — FENTANYL CITRATE 100 MCG: 50 INJECTION, SOLUTION INTRAMUSCULAR; INTRAVENOUS at 02:21

## 2023-04-22 RX ADMIN — OXYTOCIN 1 MILLI-UNITS/MIN: 10 INJECTION, SOLUTION INTRAMUSCULAR; INTRAVENOUS at 00:37

## 2023-04-22 RX ADMIN — IBUPROFEN 800 MG: 800 TABLET, FILM COATED ORAL at 08:57

## 2023-04-22 ASSESSMENT — PAIN DESCRIPTION - PAIN TYPE
TYPE: ACUTE PAIN

## 2023-04-22 ASSESSMENT — LIFESTYLE VARIABLES: EVER_SMOKED: NEVER

## 2023-04-22 NOTE — PROGRESS NOTES
0015: Received bedside report from Tania FARRAR; PT is a , EDC of  with a GA of 38w5d, RN assuming care of patient; all questions answered; pt resting comfortably in bed with significant other at bedside; denies needs at this time; VS in stable condition; pt educated regarding call light system; call light and pt belongings in reach, pt encouraged to call RN for any needs, wants, desires or concerns. Whiteboard updated. Pitocin running at 1 mU and LR running at 125 ml/hr. BP: 134/85    0230: Pt requests an epidural    0315: Mock MD at bedside for placement of epidural    0430: Pt reports feeling pressure. SVE: 10/100/+1    0529:  of viable baby boy

## 2023-04-22 NOTE — CARE PLAN
Problem: Knowledge Deficit - L&D  Goal: Patient and family/caregivers will demonstrate understanding of plan of care, disease process/condition, diagnostic tests and medications  Outcome: Progressing     Problem: Psychosocial - L&D  Goal: Patient's level of anxiety will decrease  Outcome: Progressing     Problem: Pain  Goal: Patient's pain will be alleviated or reduced to the patient’s comfort goal  Outcome: Progressing   The patient is Stable - Low risk of patient condition declining or worsening    Shift Goals  Clinical Goals: cervical change  Patient Goals: healthy mom  Family Goals: healthy baby

## 2023-04-22 NOTE — PROGRESS NOTES
0800Assume care from CHARAN Velazquez. Oriented patient to room,call light, and emergancy light. Assessment completed,fundus firm,lochia light. Plan of care reviewed, verbilized understanding. Patient denies pain at this time, will call if intervention needed.   9340 Dr. Chawla informed of patient hemoglobin results. Per Dr. Chawla, continue to monitor patient. Update MD if patient becomes symptomatic.
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
## 2023-04-22 NOTE — ANESTHESIA PROCEDURE NOTES
Epidural Block    Date/Time: 4/22/2023 3:47 AM  Performed by: Daniel Arias M.D.  Authorized by: Daniel Arias M.D.     Patient Location:  OB  Start Time:  4/22/2023 3:47 AM  Reason for Block: labor analgesia    patient identified, IV checked, site marked, risks and benefits discussed, surgical consent, monitors and equipment checked, pre-op evaluation and timeout performed    Patient Position:  Sitting  Prep: ChloraPrep, patient draped and sterile technique    Monitoring:  Blood pressure, continuous pulse oximetry and heart rate  Approach:  Midline  Location:  L3-L4  Injection Technique:  MASON air and MASON saline  Skin infiltration:  Lidocaine  Strength:  1%  Dose:  3ml  Needle Type:  Tuohy  Needle Gauge:  17 G  Needle Length:  3.5 in  Loss of resistance::  6.5  Catheter Size:  19 G  Catheter at Skin Depth:  12  Test Dose Result:  Negative

## 2023-04-22 NOTE — PROGRESS NOTES
I saw the patient with Day Morgan, nurse midwifery student. I performed a physical exam and medical decision making. I reviewed, verified, the documentation of 2023 and agree with the content and plan as written by the medical student    S: Patient is admitted for IOL for GHTN. PIH labs wnl. Asymptomatic at this time. Mild range BP at this time, afebrile. POC discussed including SVE with AROM. Pt is agreeable to plan. Aware of pain options.      O: BP (!) 139/96   Pulse 82   Temp 37.3 °C (99.1 °F) (Temporal)   Resp 18   LMP 2022 (Approximate)   SpO2 95%            FHTs:  Baseline 140, variability: moderate, accels: present, decels:absent         [place]: Contractions q 2-4        SVE: 5/80/-2 AROM with moderate amount of clear fluid.     Pit running at 1mu/min      A/P:    1.  IUP @ 38.5  2.  Cat 1 FHTs    3.  S/p AROM and pitocin   4.  Anticipate     Day CARRIZALES

## 2023-04-22 NOTE — PROGRESS NOTES
0700 - Report received from Georgina FARRAR. Pt in bed at this time without c/o pain. Discussed plan of care to transfer to postpartum when pt is ready, questions answered.    0715 - Pt assisted up to bathroom, steady gait, pt able to void spontaneously, denies pain with urination, vahe care provided and pad changed.     0800 - Pt transferred to [facility] with all belongings, report and transfer of care given to Rekha FARRAR, ID bands verified.

## 2023-04-22 NOTE — ANESTHESIA TIME REPORT
Anesthesia Start and Stop Event Times     Date Time Event    4/22/2023 0326 Ready for Procedure     0327 Anesthesia Start     0529 Anesthesia Stop        Responsible Staff  04/22/23    Name Role Begin End    Daniel Arias M.D. Anesth 0327 0529        Overtime Reason:  no overtime (within assigned shift)    Comments:

## 2023-04-22 NOTE — L&D DELIVERY NOTE
Delivery Note     PATIENT ID:  NAME:             Humaira Mejia  MRN:               [id number]  YOB: 2005     Labor Course  Pt was admitted for IOL for GHT.      On 2023  at 05:29, this 17 y.o.,  38w5d now  , GBS negative female delivered via  in a  ANABELL position under epidural anesthesia a viable male infant weight 7lbs 4.8oz APGAR scores of 8 and 9 at one and five minutes.   Baby to maternal abdomen.  Spontaneous cry.  Mouth and nares bulb suctioned by transition RN. Delayed cord clamping occurred with cord doubly clamped by myself and cut by FOB after pulsations had stopped.  Pitocin infusing in IVF.  Spontaneous delivery of placenta grossly intact @ 05:47.  CVx3. FF and bleeding small.  Upon vaginal exam, there was 1st degree and bilateral vaginal tear repaired with 3.0 vicryl ct, and  periurethral laceration which was repaired using 4.0 sh in the usual sterile fashion. 1% lidocaine given for pain control. Pt and infant are stable.  Lap count: 10.  Estimated blood loss: 350.       Delivered by Day CARRIZALES supervised by Simi Linda CNM, APRN    Dr Guevara is the attending    Delivery was attended by RUTH CARRIZALES under my direct guidance and supervision. I was immediately present for the entire procedure

## 2023-04-22 NOTE — PROGRESS NOTES
"Pt presents to triage c/o high bp's that she has been monitoring at home. States her bp's range from the 120'-150's/ 80's-90's.  Confirms good fetal movemnts; no c/o contractions but \"some tightening.\" Reflexes WNL and negative clonus. Denies h/a, blurred vision and visual spots. Denies epigastric pain.    2034: UA and protein/creat ration reported to Dr Guevara. Pt had an appt today at the [facility]. Orders received to cont bp monitoring and CBC and CMP.    2045: CNM at bedside.    2110: pt to be admitted for [facility]   "
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
## 2023-04-22 NOTE — ANESTHESIA PREPROCEDURE EVALUATION
Date: 04/22/23  Procedure: Labor Epidural         Relevant Problems   GI   (positive) Hiatal hernia     IUP, requesting labor epidural    Physical Exam    Airway   Mallampati: II  TM distance: >3 FB  Neck ROM: full       Cardiovascular - normal exam  Rhythm: regular  Rate: normal  (-) murmur     Dental - normal exam           Pulmonary - normal exam  Breath sounds clear to auscultation     Abdominal    Neurological - normal exam                 Anesthesia Plan    ASA 2       Plan - epidural   Neuraxial block will be labor analgesia                  Pertinent diagnostic labs and testing reviewed    Informed Consent:    Anesthetic plan and risks discussed with patient.

## 2023-04-22 NOTE — PROCEDURES
Delivery Note    PATIENT ID:  NAME:  Humaira Mejia  MRN:               [id number]  YOB: 2005    Labor Course  Pt was admitted for IOL for GHT.     On 2023  at 05:29, this 17 y.o.,  38w5d now  , GBS negative female delivered via  in a  ANABELL position under epidural anesthesia a viable male infant weight 7lbs 4.8oz APGAR scores of 8 and 9 at one and five minutes.   Baby to maternal abdomen.  Spontaneous cry.  Mouth and nares bulb suctioned by transition RN. Delayed cord clamping occurred with cord doubly clamped by myself and cut by FOB after pulsations had stopped.  Pitocin infusing in IVF.  Spontaneous delivery of placenta grossly intact @ 05:47.  CVx3. FF and bleeding small.  Upon vaginal exam, there was 1st degree and bilateral vaginal tear repaired with 3.0 vicryl ct, and  periurethral laceration which was repaired using 4.0 sh in the usual sterile fashion. 1% lidocaine given for pain control. Pt and infant are stable.  Lap count: 10.  Estimated blood loss: 350.      Delivered by Day CARRIZALES supervised by Simi Linda CNM, APRN  Dr. Guevara , attending physician

## 2023-04-22 NOTE — ANESTHESIA POSTPROCEDURE EVALUATION
Patient: Humaira Mejia    Procedure Summary     Date: 04/22/23 Room / Location:     Anesthesia Start: 0327 Anesthesia Stop: 0529    Procedure: Labor Epidural Diagnosis:     Scheduled Providers:  Responsible Provider: Daniel Arias M.D.    Anesthesia Type: epidural ASA Status: 2          Final Anesthesia Type: epidural  Last vitals  BP   Blood Pressure: 130/79    Temp   36.7 °C (98 °F)    Pulse   93   Resp   18    SpO2   95 %      Anesthesia Post Evaluation    Patient location during evaluation: PACU  Patient participation: complete - patient participated  Level of consciousness: awake and alert    Airway patency: patent  Anesthetic complications: no  Cardiovascular status: hemodynamically stable  Respiratory status: acceptable  Hydration status: euvolemic    PONV: none          No notable events documented.     Nurse Pain Score: 5 (NPRS)

## 2023-04-23 LAB
BASOPHILS # BLD AUTO: 0.4 % (ref 0–1.8)
BASOPHILS # BLD: 0.04 K/UL (ref 0–0.12)
EOSINOPHIL # BLD AUTO: 0.11 K/UL (ref 0–0.51)
EOSINOPHIL NFR BLD: 1.2 % (ref 0–6.9)
ERYTHROCYTE [DISTWIDTH] IN BLOOD BY AUTOMATED COUNT: 51.3 FL (ref 35.9–50)
HCT VFR BLD AUTO: 26.6 % (ref 37–47)
HGB BLD-MCNC: 8 G/DL (ref 12–16)
IMM GRANULOCYTES # BLD AUTO: 0.18 K/UL (ref 0–0.11)
IMM GRANULOCYTES NFR BLD AUTO: 1.9 % (ref 0–0.9)
LYMPHOCYTES # BLD AUTO: 1.6 K/UL (ref 1–4.8)
LYMPHOCYTES NFR BLD: 17.1 % (ref 22–41)
MCH RBC QN AUTO: 25.9 PG (ref 27–33)
MCHC RBC AUTO-ENTMCNC: 30.1 G/DL (ref 33.6–35)
MCV RBC AUTO: 86.1 FL (ref 81.4–97.8)
MONOCYTES # BLD AUTO: 0.59 K/UL (ref 0–0.85)
MONOCYTES NFR BLD AUTO: 6.3 % (ref 0–13.4)
NEUTROPHILS # BLD AUTO: 6.81 K/UL (ref 2–7.15)
NEUTROPHILS NFR BLD: 73.1 % (ref 44–72)
NRBC # BLD AUTO: 0.02 K/UL
NRBC BLD-RTO: 0.2 /100 WBC
PLATELET # BLD AUTO: 135 K/UL (ref 164–446)
RBC # BLD AUTO: 3.09 M/UL (ref 4.2–5.4)
WBC # BLD AUTO: 9.3 K/UL (ref 4.8–10.8)

## 2023-04-23 PROCEDURE — 85025 COMPLETE CBC W/AUTO DIFF WBC: CPT

## 2023-04-23 PROCEDURE — A9270 NON-COVERED ITEM OR SERVICE: HCPCS

## 2023-04-23 PROCEDURE — 700102 HCHG RX REV CODE 250 W/ 637 OVERRIDE(OP)

## 2023-04-23 PROCEDURE — A9270 NON-COVERED ITEM OR SERVICE: HCPCS | Performed by: NURSE PRACTITIONER

## 2023-04-23 PROCEDURE — 700102 HCHG RX REV CODE 250 W/ 637 OVERRIDE(OP): Performed by: NURSE PRACTITIONER

## 2023-04-23 PROCEDURE — 770002 HCHG ROOM/CARE - OB PRIVATE (112)

## 2023-04-23 PROCEDURE — 36415 COLL VENOUS BLD VENIPUNCTURE: CPT

## 2023-04-23 RX ORDER — IBUPROFEN 800 MG/1
800 TABLET ORAL EVERY 8 HOURS PRN
Qty: 30 TABLET | Refills: 0 | Status: SHIPPED | OUTPATIENT
Start: 2023-04-23 | End: 2023-07-05

## 2023-04-23 RX ORDER — ACETAMINOPHEN 500 MG
1000 TABLET ORAL EVERY 6 HOURS PRN
Qty: 30 TABLET | Refills: 0 | Status: SHIPPED | OUTPATIENT
Start: 2023-04-23 | End: 2023-07-05

## 2023-04-23 RX ADMIN — ACETAMINOPHEN 1000 MG: 500 TABLET, FILM COATED ORAL at 08:21

## 2023-04-23 RX ADMIN — FERROUS SULFATE TAB 325 MG (65 MG ELEMENTAL FE) 325 MG: 325 (65 FE) TAB at 09:57

## 2023-04-23 RX ADMIN — IBUPROFEN 800 MG: 800 TABLET, FILM COATED ORAL at 10:24

## 2023-04-23 RX ADMIN — PRENATAL WITH FERROUS FUM AND FOLIC ACID 1 TABLET: 3080; 920; 120; 400; 22; 1.84; 3; 20; 10; 1; 12; 200; 27; 25; 2 TABLET ORAL at 09:57

## 2023-04-23 RX ADMIN — DOCUSATE SODIUM 100 MG: 100 CAPSULE, LIQUID FILLED ORAL at 10:10

## 2023-04-23 RX ADMIN — ACETAMINOPHEN 1000 MG: 500 TABLET, FILM COATED ORAL at 21:18

## 2023-04-23 ASSESSMENT — PAIN DESCRIPTION - PAIN TYPE
TYPE: ACUTE PAIN

## 2023-04-23 NOTE — PROGRESS NOTES
Nursing assessment performed. Plan of care and pain management plan discussed. Patient will notify RN when she would like pain medication. Patient encouraged to call for any questions, concerns or needs.

## 2023-04-23 NOTE — PROGRESS NOTES
Bedside report received from CHARAN Funez. Care assumed. Shift chart check complete. Orders reviewed.

## 2023-04-23 NOTE — CARE PLAN
The patient is Stable - Low risk of patient condition declining or worsening    Shift Goals  Clinical Goals: Patient will maintain stable VS; Lochia WDL; Pain WDL  Patient Goals: healthy mom  Family Goals: healthy baby    Progress made toward(s) clinical / shift goals:    Problem: Knowledge Deficit - Standard  Goal: Patient and family/care givers will demonstrate understanding of plan of care, disease process/condition, diagnostic tests and medications  Outcome: Progressing     Problem: Knowledge Deficit - Postpartum  Goal: Patient will verbalize and demonstrate understanding of self and infant care  Outcome: Progressing     Problem: Psychosocial - Postpartum  Goal: Patient will verbalize and demonstrate effective bonding and parenting behavior  Outcome: Progressing     Problem: Altered Physiologic Condition  Goal: Patient physiologically stable as evidenced by normal lochia, palpable uterine involution and vitals within normal limits  Outcome: Progressing     Problem: Infection - Postpartum  Goal: Postpartum patient will be free of signs and symptoms of infection  Outcome: Progressing
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
## 2023-04-23 NOTE — DISCHARGE INSTRUCTIONS
PATIENT DISCHARGE EDUCATION INSTRUCTION SHEET    REASONS TO CALL YOUR OBSTETRICIAN  Persistent fever, shaking, chills (Temperature higher than 100.4) may indicate you have an infection  Heavy bleeding: soaking more than 1 pad per hour; Passing clots an egg-sized clot or bigger may mean you have an postpartum hemorrhage  Foul odor from vagina or bad smelling or discolored discharge or blood  Breast infection (Mastitis symptoms); breast pain, chills, fever, redness or red streaks, may feel flu like symptoms  Urinary pain, burning or frequency  Incision that is not healing, increased redness, swelling, tenderness or pain, or any pus from episiotomy or  site may mean you have an infection  Redness, swelling, warmth, or painful to touch in the calf area of your leg may mean you have a blood clot  Severe or intensified depression, thoughts or feelings of wanting to hurt yourself or someone else   Pain in chest, obstructed breathing or shortness of breath (trouble catching your breath) may mean you are having a postpartum complication. Call your provider immediately   Headache that does not get better, even after taking medicine, a bad headache with vision changes or pain in the upper right area of your belly may mean you have high blood pressure or post birth preeclampsia. Call your provider immediately    HAND WASHING  All family and friends should wash their hands:  Before and after holding the baby  Before feeding the baby  After using the restroom or changing the baby's diaper    WOUND CARE  Ask your physician for additional care instructions. In general:  Episiotomy/Laceration  May use vahe-spray bottle, witch hazel pads and dermaplast spray for comfort  Use vahe-spray bottle after urinating to cleanse perineal area  To prevent burning during urination spray vahe-water bottle on labial area   Pat perineal area dry until episiotomy/laceration is healed  Continue to use vahe-bottle until bleeding stops as  needed  If have a 2nd degree laceration or greater, a Sitz bath can offer relief from soreness, burning, and inflammation   Sitz Bath   Sit in 6 inches of warm water and soak laceration as needed until the laceration heals    VAGINAL CARE AND BLEEDING  Nothing inside vagina for 6 weeks:   No sexual intercourse, tampons or douching  Bleeding may continue for 2-4 weeks. Amount and color may vary  Soaking 1 pad or more in an hour for several hours is considered heavy bleeding  Passing large egg sized blood clots can be concerning  If you feel like you have heavy bleeding or are having increasing amount of blood clots call your Obstetrician immediately  If you begin feeling faint upon standing, feeling sick to your stomach, have clammy skin, a really fast heartbeat, have chills, start feeling confused, dizzy, sleepy or weak, or feeling like you're going to faint call your Obstetrician immediately    HYPERTENSION   Preeclampsia or gestational hypertension are types of high blood pressure that only pregnant women can get. It is important for you to be aware of symptoms to seek early intervention and treatment. If you have any of these symptoms immediately call your Obstetrician    Vision changes or blurred vision   Severe headache or pain that is unrelieved with medication and will not go away  Persistent pain in upper abdomen or shoulder   Increased swelling of face, feet, or hands  Difficulty breathing or shortness of breath at rest  Urinating less than usual    URINATION AND BOWEL MOVEMENTS  Eating more fiber (bran cereal, fruits, and vegetables) and drinking plenty of fluids will help to avoid constipation  Urinary frequency and urgency after childbirth is normal  If you experience any urinary pain, burning or frequency call your provider    BIRTH CONTROL  It is possible to become pregnant at any time after delivery and while breastfeeding  Plan to discuss a method of birth control with your physician at your post  "delivery follow up visit    POSTPARTUM BLUES  During the first few days after birth, you may experience a sense of the \"blues\" which may include impatience, irritability or even crying. These feelings come and go quickly. However, as many as 1 in 10 women experience emotional symptoms known as postpartum depression.     POSTPARTUM DEPRESSION    May start as early as the second or third day after delivery or take several weeks or months to develop. Symptoms of \"blues\" are present, but are more intense: Crying spells; loss of appetite; feelings of hopelessness or loss of control; fear of touching the baby; over concern or no concern at all about the baby; little or no concern about your own appearance/caring for yourself; and/or inability to sleep or excessive sleeping. Contact your Obstetrician if you are experiencing any of these symptoms     PREVENTING SHAKEN BABY  If you are angry or stressed, PUT THE BABY IN THE CRIB, step away, take some deep breaths, and wait until you are calm to care for the baby. DO NOT SHAKE THE BABY. You are not alone, call a supporter for help.  [facility] 24/7 crisis call line (313-811-2546) or (1-619.947.7049)  You can also text them, text \"ANSWER\" (212833)  "

## 2023-04-23 NOTE — CARE PLAN
The patient is Stable - Low risk of patient condition declining or worsening    Shift Goals  Clinical Goals: pain control; lochia WDL  Patient Goals: healthy mom  Family Goals: healthy baby    Progress made toward(s) clinical / shift goals: Pain was controlled through heat packs and PRN pain medication administration. Lochia scant to light throughout the shift.     Patient is not progressing towards the following goals:

## 2023-04-23 NOTE — PROGRESS NOTES
1900: Received bedside report from day shift RNLeana Whiteboard updated.     2015: Completed assessment. Pt denies of pain at this time. IV is saline locked. No signs of phlebitis or infiltration at the IV site. POC discussed. Pt verbalized understanding. Call light placed within reach.

## 2023-04-24 VITALS
DIASTOLIC BLOOD PRESSURE: 82 MMHG | TEMPERATURE: 97.9 F | HEART RATE: 92 BPM | SYSTOLIC BLOOD PRESSURE: 121 MMHG | OXYGEN SATURATION: 97 % | RESPIRATION RATE: 18 BRPM

## 2023-04-24 LAB
ALBUMIN SERPL BCP-MCNC: 3.1 G/DL (ref 3.2–4.9)
ALBUMIN/GLOB SERPL: 1.2 G/DL
ALP SERPL-CCNC: 121 U/L (ref 30–99)
ALT SERPL-CCNC: 10 U/L (ref 2–50)
ANION GAP SERPL CALC-SCNC: 11 MMOL/L (ref 7–16)
AST SERPL-CCNC: 13 U/L (ref 12–45)
BILIRUB SERPL-MCNC: 0.2 MG/DL (ref 0.1–1.5)
BUN SERPL-MCNC: 11 MG/DL (ref 8–22)
CALCIUM ALBUM COR SERPL-MCNC: 9.1 MG/DL (ref 8.5–10.5)
CALCIUM SERPL-MCNC: 8.4 MG/DL (ref 8.5–10.5)
CHLORIDE SERPL-SCNC: 106 MMOL/L (ref 96–112)
CO2 SERPL-SCNC: 23 MMOL/L (ref 20–33)
CREAT SERPL-MCNC: 0.62 MG/DL (ref 0.5–1.4)
GFR SERPLBLD CREATININE-BSD FMLA CKD-EPI: 131 ML/MIN/1.73 M 2
GLOBULIN SER CALC-MCNC: 2.6 G/DL (ref 1.9–3.5)
GLUCOSE SERPL-MCNC: 88 MG/DL (ref 65–99)
POTASSIUM SERPL-SCNC: 3.8 MMOL/L (ref 3.6–5.5)
PROT SERPL-MCNC: 5.7 G/DL (ref 6–8.2)
SODIUM SERPL-SCNC: 140 MMOL/L (ref 135–145)

## 2023-04-24 PROCEDURE — 80053 COMPREHEN METABOLIC PANEL: CPT

## 2023-04-24 PROCEDURE — 700102 HCHG RX REV CODE 250 W/ 637 OVERRIDE(OP)

## 2023-04-24 PROCEDURE — A9270 NON-COVERED ITEM OR SERVICE: HCPCS | Performed by: NURSE PRACTITIONER

## 2023-04-24 PROCEDURE — 36415 COLL VENOUS BLD VENIPUNCTURE: CPT

## 2023-04-24 PROCEDURE — A9270 NON-COVERED ITEM OR SERVICE: HCPCS

## 2023-04-24 PROCEDURE — 700102 HCHG RX REV CODE 250 W/ 637 OVERRIDE(OP): Performed by: NURSE PRACTITIONER

## 2023-04-24 RX ADMIN — IBUPROFEN 800 MG: 800 TABLET, FILM COATED ORAL at 08:23

## 2023-04-24 RX ADMIN — PRENATAL WITH FERROUS FUM AND FOLIC ACID 1 TABLET: 3080; 920; 120; 400; 22; 1.84; 3; 20; 10; 1; 12; 200; 27; 25; 2 TABLET ORAL at 08:23

## 2023-04-24 RX ADMIN — FERROUS SULFATE TAB 325 MG (65 MG ELEMENTAL FE) 325 MG: 325 (65 FE) TAB at 08:23

## 2023-04-24 RX ADMIN — IBUPROFEN 800 MG: 800 TABLET, FILM COATED ORAL at 01:00

## 2023-04-24 RX ADMIN — DOCUSATE SODIUM 100 MG: 100 CAPSULE, LIQUID FILLED ORAL at 08:23

## 2023-04-24 ASSESSMENT — PAIN DESCRIPTION - PAIN TYPE
TYPE: ACUTE PAIN
TYPE: ACUTE PAIN

## 2023-04-24 ASSESSMENT — EDINBURGH POSTNATAL DEPRESSION SCALE (EPDS)
THINGS HAVE BEEN GETTING ON TOP OF ME: YES, SOMETIMES I HAVEN'T BEEN COPING AS WELL AS USUAL
I HAVE BEEN SO UNHAPPY THAT I HAVE HAD DIFFICULTY SLEEPING: NOT VERY OFTEN
I HAVE LOOKED FORWARD WITH ENJOYMENT TO THINGS: RATHER LESS THAN I USED TO
THE THOUGHT OF HARMING MYSELF HAS OCCURRED TO ME: HARDLY EVER
I HAVE FELT SAD OR MISERABLE: NO, NOT AT ALL
I HAVE BEEN ANXIOUS OR WORRIED FOR NO GOOD REASON: YES, VERY OFTEN
I HAVE BLAMED MYSELF UNNECESSARILY WHEN THINGS WENT WRONG: YES, MOST OF THE TIME
I HAVE FELT SCARED OR PANICKY FOR NO GOOD REASON: YES, SOMETIMES
I HAVE BEEN SO UNHAPPY THAT I HAVE BEEN CRYING: NO, NEVER
I HAVE BEEN ABLE TO LAUGH AND SEE THE FUNNY SIDE OF THINGS: NOT QUITE SO MUCH NOW

## 2023-04-24 NOTE — PROGRESS NOTES
"Called to bedside with concern of chest tightness.     At bedside, patient and patient partner report she had a \"panic attack.\" Reports that it was related to anxiety of going home with baby tonight.     Does report during the event she was having shortness of breath with walking, which she is unsure if it has persisted.     No residual chest tightness, chest pain, shortness of breath, change of vision, headache, lower extremity swelling, nausea, vomiting, jaw or arm pain. No lightheadedness, dizziness, syncope, pre-syncope.         Vitals:    04/23/23 1541   BP: 114/69   Pulse: 78   Resp: 18   Temp: 36.9 °C (98.5 °F)   SpO2: 97%         Physical Exam  Constitutional:       General: She is not in acute distress.     Appearance: Normal appearance. She is not toxic-appearing.   HENT:      Head: Normocephalic.      Nose: Nose normal.      Mouth/Throat:      Mouth: Mucous membranes are dry.      Pharynx: Oropharynx is clear.   Eyes:      Extraocular Movements: Extraocular movements intact.      Conjunctiva/sclera: Conjunctivae normal.   Cardiovascular:      Rate and Rhythm: Normal rate and regular rhythm.      Heart sounds: No murmur heard.    No friction rub. No gallop.   Pulmonary:      Effort: Pulmonary effort is normal. No respiratory distress.      Breath sounds: No wheezing or rales.   Musculoskeletal:         General: Normal range of motion.      Cervical back: Normal range of motion and neck supple.   Skin:     General: Skin is warm and dry.   Neurological:      General: No focal deficit present.      Mental Status: She is alert and oriented to person, place, and time.        A&P:  Called to bedside, with complete resolution of symptoms at time of exam. Likely event related to anxiety, patient also anemic to 7.9. Discussed watchful waiting for return of anemia symptoms and CBC with patient versus blood transfusion. Patient preference for watchful waiting and CBC.   -PM CBC, if Hgb <7.0 will transfuse   -If " patient becomes symptomatic again will alert provider and will transfuse     Discussed plan with attending Dr. Layo Chawla MD

## 2023-04-24 NOTE — CARE PLAN
The patient is Stable - Low risk of patient condition declining or worsening    Shift Goals  Clinical Goals: pain control, monitor labs, vss,  Patient Goals: pain management  Family Goals: education and support    Progress made toward(s) clinical / shift goals: Plan of care and scheduled/available medications discussed with pt. Pt reported of back and arm pain. Medicated per MAR. Pain tolerable after interventions. Educate parents on how much to feed baby formula and burping baby each feeding. Parents demonstrating effective bonding with baby. Pt has stable lochia, vs wdl, fundus firm and light bleeding.       Problem: Knowledge Deficit - Postpartum  Goal: Patient will verbalize and demonstrate understanding of self and infant care  Outcome: Progressing     Problem: Pain - Standard  Goal: Alleviation of pain or a reduction in pain to the patient’s comfort goal  Outcome: Progressing     Problem: Psychosocial - Postpartum  Goal: Patient will verbalize and demonstrate effective bonding and parenting behavior  Outcome: Progressing     Problem: Altered Physiologic Condition  Goal: Patient physiologically stable as evidenced by normal lochia, palpable uterine involution and vitals within normal limits  Outcome: Progressing
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
## 2023-04-24 NOTE — PROGRESS NOTES
Bedside report received. Assumed care of pt. Pt resting comfortably in bed. FOB at bedside. Infant in open crib. A/Ox4, VSS, and assessed pain. All needs met. POC reviewed and white board updated. Call light in reach. Bed locked in lowest position with 2 upper bed rails up.

## 2023-04-24 NOTE — PROGRESS NOTES
Report received from Rekah RN @ 0700. Dr Siddiqui is in the room upon report.     @ 0830: Discussed plan of care. Assessment done. Medicated for pain. She denies pain when she void. Encouraged patient to use the perineal bottle to prevent her from infection. FOB is at the bedside. Since patient have the possibility of discharging today, have patient take a look the education instruction and will discuss by this RN to patient and FOB. Encouraged to call if with need. Will check patient at intervals.     @ 1230: Discussed to patient and FOB the discharge instruction including baby's discharge paper. This RN went section by section and discussing the discharge education. Patient and FOB said that they both understand and no questions at the moment. Patient is aware of where to  her prescription medications. This RN made sure that they know where to go for appointment and labs. Emphasized the importance of the second  screen test. Patient and FOB said that everything is clear regarding discharge instruction.

## 2023-04-29 ENCOUNTER — HOSPITAL ENCOUNTER (EMERGENCY)
Facility: MEDICAL CENTER | Age: 18
End: 2023-04-30
Attending: OBSTETRICS & GYNECOLOGY | Admitting: OBSTETRICS & GYNECOLOGY
Payer: MEDICAID

## 2023-04-29 VITALS
RESPIRATION RATE: 18 BRPM | SYSTOLIC BLOOD PRESSURE: 128 MMHG | HEART RATE: 101 BPM | TEMPERATURE: 97.6 F | HEIGHT: 64 IN | OXYGEN SATURATION: 94 % | DIASTOLIC BLOOD PRESSURE: 80 MMHG | BODY MASS INDEX: 31.58 KG/M2 | WEIGHT: 185 LBS

## 2023-04-29 PROCEDURE — 99282 EMERGENCY DEPT VISIT SF MDM: CPT

## 2023-04-29 ASSESSMENT — PAIN SCALES - GENERAL: PAINLEVEL: 0 - NO PAIN

## 2023-04-29 ASSESSMENT — FIBROSIS 4 INDEX: FIB4 SCORE: 0.58

## 2023-04-30 LAB
APPEARANCE UR: ABNORMAL
COLOR UR AUTO: YELLOW
GLUCOSE UR QL STRIP.AUTO: NEGATIVE MG/DL
KETONES UR QL STRIP.AUTO: NEGATIVE MG/DL
LEUKOCYTE ESTERASE UR QL STRIP.AUTO: ABNORMAL
NITRITE UR QL STRIP.AUTO: NEGATIVE
PH UR STRIP.AUTO: 7 [PH] (ref 5–8)
PROT UR QL STRIP: 30 MG/DL
RBC UR QL AUTO: ABNORMAL
SP GR UR STRIP.AUTO: 1.02 (ref 1–1.03)

## 2023-04-30 PROCEDURE — 81002 URINALYSIS NONAUTO W/O SCOPE: CPT

## 2023-04-30 PROCEDURE — 87086 URINE CULTURE/COLONY COUNT: CPT

## 2023-04-30 ASSESSMENT — ENCOUNTER SYMPTOMS
CONSTITUTIONAL NEGATIVE: 1
DIZZINESS: 0
ROS GI COMMENTS: LOW APPETITE
ABDOMINAL PAIN: 1
HEADACHES: 1
CONSTIPATION: 0
VOMITING: 0
DEPRESSION: 1
NAUSEA: 0
WEAKNESS: 0

## 2023-04-30 NOTE — ED PROVIDER NOTES
Emergency Obstetric Consultation     Date of Service  2023    Reason for Consultation  Chief Complaint   Patient presents with    Other     Pt is unsure about what is considered appropriate bleeding and cramping. Also concerned about it burning and stinging while she pees and if that is related to an infection or her stitches.         History of Presenting Illness  17 y.o. female who presented 2023 with Reason for consult: nauseaPatient 1 week postpartum from vaginal delivery. She presents reporting that she thinks she has a urinary tract infection. She reports that she has small amount of burning with urination. She is still having some bleeding. She admits that she is not drinking a lot of water and not sleeping well as her partner is not helping with infant care at night time.     She is living with her partner and his family. He is working and going to school. His mother and sister are helping Humaira with care for the infant. She is accompanied today by her sister who does not live with her. Her parents are living at a distance and in a trailer without dependable heat.     Patient reports sadness about not receiving help from her fiance. She does have history of depression, anxiety, suicide attempt, and cutting. She did take baby to the pediatrician a few days ago and there was a mention of referral to therapy due to their concern for depression.   .    Review of Systems  Review of Systems   Constitutional: Negative.    Gastrointestinal:  Positive for abdominal pain. Negative for constipation, nausea and vomiting.        Low appetite   Genitourinary:  Positive for dysuria. Negative for frequency and urgency.   Neurological:  Positive for headaches. Negative for dizziness and weakness.   Psychiatric/Behavioral:  Positive for depression. Negative for suicidal ideas.      Obstetric History    OB History    Para Term  AB Living   1  1     1   SAB IAB Ectopic Molar Multiple Live Births            0 1      # Outcome Date GA Lbr Lucio/2nd Weight Sex Delivery Anes PTL Lv   1 Term 23 38w5d / 00:59 3.31 kg (7 lb 4.8 oz) M Vag-Spont EPI N AYANA       Gynecologic History  Patient's last menstrual period was 2022 (approximate).    Medical History  Past Medical History:   Diagnosis Date    Hiatal hernia     Patient denies medical problems        Surgical History   has no past surgical history on file.    Family History  family history includes No Known Problems in her brother, father, mother, and sister.    Social History   reports that she has never smoked. She has been exposed to tobacco smoke. She has never used smokeless tobacco. She reports that she does not drink alcohol and does not use drugs.    Medications  Medications Prior to Admission   Medication Sig Dispense Refill Last Dose    ibuprofen (MOTRIN) 800 MG Tab Take 1 Tablet by mouth every 8 hours as needed for Mild Pain. 30 Tablet 0     acetaminophen (TYLENOL) 500 MG Tab Take 2 Tablets by mouth every 6 hours as needed for Mild Pain. 30 Tablet 0     ferrous sulfate 325 (65 Fe) MG tablet Take 1 Tablet by mouth every day. 60 Tablet 2     Prenatal Vit-Fe Fumarate-FA (PRENATAL VITAMINS) 28-0.8 MG Tab TAKE 1 TABLET BY MOUTH EVERY DAY 30 Tablet 6        Allergies  Allergies   Allergen Reactions    Horse Allergy Itching    Pollen Extract Runny Nose     Runny nose       Physical Exam  Skin:     General: Skin is warm and dry.   HENT:      Head: Normocephalic and atraumatic.   Cardiovascular:      Rate and Rhythm: Normal rate and regular rhythm.   Abdominal:      General: Abdomen is flat.      Palpations: Abdomen is soft.   Constitutional:       Appearance: Normal appearance.   Pulmonary:      Effort: Pulmonary effort is normal.   Psychiatric:         Mood and Affect: Mood normal.         Behavior: Behavior normal.   Neurological:      Mental Status: She is alert.           Assessment & Plan  Assessment:  1. 18 yo  PPD #6 s/p   2. Postpartum  Mood Disturbance  3. Dysuria    Plan:  1. Discussed with patient at length verbalizing needs to partner.   2. We discussed normal postpartum course and expectations. She can take motrin PRN for pain.   3. Scheduled patient in clinic for reevaluation of mood.         ONEL Mcbride

## 2023-04-30 NOTE — PROGRESS NOTES
2340- Pt arrives to triage, delivered 4/22/23. States that she is having a new onset of burning and stinging when she urinates, is concerned about cramps and bleeding.    0015- JORGE Izquierdo CNM at bedside to evaluate pt.   0055- Update given to JORGE Izquierdo CNM with UA results, orders for urine culture.    0104- Pt encouraged to orally hydrate. Discharge orders received.   0140- Discharge instructions reviewed, pt verbalized understanding and feels comfortable going home at this time and is aware of when to call or return to the hospital.

## 2023-05-02 LAB
BACTERIA UR CULT: NORMAL
SIGNIFICANT IND 70042: NORMAL
SITE SITE: NORMAL
SOURCE SOURCE: NORMAL

## 2023-05-03 ENCOUNTER — POST PARTUM (OUTPATIENT)
Dept: OBGYN | Facility: CLINIC | Age: 18
End: 2023-05-03
Payer: MEDICAID

## 2023-05-03 VITALS — SYSTOLIC BLOOD PRESSURE: 114 MMHG | DIASTOLIC BLOOD PRESSURE: 70 MMHG | WEIGHT: 165 LBS | BODY MASS INDEX: 28.32 KG/M2

## 2023-05-03 DIAGNOSIS — Z86.59 HISTORY OF ANXIETY: ICD-10-CM

## 2023-05-03 DIAGNOSIS — Z91.51 HISTORY OF SUICIDE ATTEMPT: ICD-10-CM

## 2023-05-03 DIAGNOSIS — Z91.52 HISTORY OF SELF MUTILATION: ICD-10-CM

## 2023-05-03 PROCEDURE — 0503F POSTPARTUM CARE VISIT: CPT | Performed by: ADVANCED PRACTICE MIDWIFE

## 2023-05-03 ASSESSMENT — FIBROSIS 4 INDEX: FIB4 SCORE: 0.58

## 2023-05-03 NOTE — PROGRESS NOTES
Pt here to discuss anxiety.    Pt states she would like to discuss directly with [facility] Sorensen# [phone]  Pharmacy confirmed
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
## 2023-05-05 ENCOUNTER — DOCUMENTATION (OUTPATIENT)
Dept: OBGYN | Facility: CLINIC | Age: 18
End: 2023-05-05
Payer: COMMERCIAL

## 2023-05-05 NOTE — PROGRESS NOTES
Attempted to call patient to check in with her mood. Did leave voicemail indicating that she can call the office to be seen sooner than her next appointment if she desires. Safety plan was developed previously for any SI/HI.

## 2023-06-07 ENCOUNTER — POST PARTUM (OUTPATIENT)
Dept: OBGYN | Facility: CLINIC | Age: 18
End: 2023-06-07
Payer: MEDICAID

## 2023-06-07 VITALS — DIASTOLIC BLOOD PRESSURE: 62 MMHG | BODY MASS INDEX: 27.81 KG/M2 | WEIGHT: 162 LBS | SYSTOLIC BLOOD PRESSURE: 108 MMHG

## 2023-06-07 DIAGNOSIS — F32.A ANXIETY AND DEPRESSION: ICD-10-CM

## 2023-06-07 DIAGNOSIS — F41.9 ANXIETY AND DEPRESSION: ICD-10-CM

## 2023-06-07 PROBLEM — O99.019 ANEMIA IN PREGNANCY: Status: RESOLVED | Noted: 2023-03-31 | Resolved: 2023-06-07

## 2023-06-07 PROBLEM — Z34.00 PREGNANCY, SUPERVISION OF FIRST: Status: RESOLVED | Noted: 2022-11-04 | Resolved: 2023-06-07

## 2023-06-07 PROCEDURE — 0503F POSTPARTUM CARE VISIT: CPT | Performed by: NURSE PRACTITIONER

## 2023-06-07 PROCEDURE — 3078F DIAST BP <80 MM HG: CPT | Performed by: NURSE PRACTITIONER

## 2023-06-07 PROCEDURE — 3074F SYST BP LT 130 MM HG: CPT | Performed by: NURSE PRACTITIONER

## 2023-06-07 ASSESSMENT — EDINBURGH POSTNATAL DEPRESSION SCALE (EPDS)
I HAVE LOOKED FORWARD WITH ENJOYMENT TO THINGS: RATHER LESS THAN I USED TO
I HAVE FELT SAD OR MISERABLE: NOT VERY OFTEN
I HAVE BEEN SO UNHAPPY THAT I HAVE HAD DIFFICULTY SLEEPING: NOT VERY OFTEN
I HAVE BEEN ABLE TO LAUGH AND SEE THE FUNNY SIDE OF THINGS: NOT QUITE SO MUCH NOW
THINGS HAVE BEEN GETTING ON TOP OF ME: YES, SOMETIMES I HAVEN'T BEEN COPING AS WELL AS USUAL
TOTAL SCORE: 12
I HAVE BEEN ANXIOUS OR WORRIED FOR NO GOOD REASON: YES, SOMETIMES
I HAVE BEEN SO UNHAPPY THAT I HAVE BEEN CRYING: ONLY OCCASIONALLY
THE THOUGHT OF HARMING MYSELF HAS OCCURRED TO ME: NEVER
I HAVE FELT SCARED OR PANICKY FOR NO GOOD REASON: NO, NOT MUCH
I HAVE BLAMED MYSELF UNNECESSARILY WHEN THINGS WENT WRONG: YES, SOME OF THE TIME

## 2023-06-07 ASSESSMENT — ENCOUNTER SYMPTOMS
CARDIOVASCULAR NEGATIVE: 1
RESPIRATORY NEGATIVE: 1
NERVOUS/ANXIOUS: 1
GASTROINTESTINAL NEGATIVE: 1
MEMORY LOSS: 0
HALLUCINATIONS: 0
MUSCULOSKELETAL NEGATIVE: 1
CONSTITUTIONAL NEGATIVE: 1
EYES NEGATIVE: 1
DEPRESSION: 0
INSOMNIA: 0
NEUROLOGICAL NEGATIVE: 1

## 2023-06-07 ASSESSMENT — LIFESTYLE VARIABLES: SUBSTANCE_ABUSE: 0

## 2023-06-07 ASSESSMENT — FIBROSIS 4 INDEX: FIB4 SCORE: 0.58
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
## 2023-06-07 NOTE — PROGRESS NOTES
Pt here today for postpartum exam.  Delivery Date and Type 4/22 vaginal   Currently: bottle feeding  BCM: IUD   Mood. Anxiety and anger  LMP: 06/07/23   Good ph:[phone] (home)

## 2023-06-08 ENCOUNTER — APPOINTMENT (OUTPATIENT)
Dept: MEDICAL GROUP | Facility: CLINIC | Age: 18
End: 2023-06-08
Payer: COMMERCIAL

## 2023-06-29 ENCOUNTER — TELEPHONE (OUTPATIENT)
Dept: OBGYN | Facility: CLINIC | Age: 18
End: 2023-06-29
Payer: COMMERCIAL

## 2023-06-29 NOTE — TELEPHONE ENCOUNTER
Pt called wanting to speak with Ludy I told her I would send her a message. Pt believes she is pregnant so I also transferred  her to schedule an appt, but she still is requesting to speak with Ludy.

## 2023-07-05 ENCOUNTER — GYNECOLOGY VISIT (OUTPATIENT)
Dept: OBGYN | Facility: CLINIC | Age: 18
End: 2023-07-05
Payer: MEDICAID

## 2023-07-05 VITALS
BODY MASS INDEX: 27.55 KG/M2 | WEIGHT: 161.4 LBS | DIASTOLIC BLOOD PRESSURE: 62 MMHG | HEIGHT: 64 IN | SYSTOLIC BLOOD PRESSURE: 104 MMHG

## 2023-07-05 DIAGNOSIS — Z30.09 ENCOUNTER FOR COUNSELING REGARDING CONTRACEPTION: Primary | ICD-10-CM

## 2023-07-05 DIAGNOSIS — Z30.430 ENCOUNTER FOR IUD INSERTION: ICD-10-CM

## 2023-07-05 PROBLEM — D69.6 THROMBOCYTOPENIA (HCC): Status: RESOLVED | Noted: 2023-03-31 | Resolved: 2023-07-05

## 2023-07-05 PROCEDURE — 99213 OFFICE O/P EST LOW 20 MIN: CPT | Performed by: OBSTETRICS & GYNECOLOGY

## 2023-07-05 PROCEDURE — 3074F SYST BP LT 130 MM HG: CPT | Performed by: OBSTETRICS & GYNECOLOGY

## 2023-07-05 PROCEDURE — 3078F DIAST BP <80 MM HG: CPT | Performed by: OBSTETRICS & GYNECOLOGY

## 2023-07-05 ASSESSMENT — FIBROSIS 4 INDEX: FIB4 SCORE: 0.58

## 2023-07-05 NOTE — PROGRESS NOTES
"GYN PROBLEM VISIT    CC:  Contraception       HPI: Patient is a 17 y.o.  who presented initially as an IUD insertion visit but states she is unsure and would rather discuss today instead.  She initially reported LMP of 6/7 and unprotected intercourse on , 7/3.  However, when discussing with patient she later informs me she started her period yesterday but that her fiance thinks she \"could be pregnant.\"  Patient is 2mo PP having delivered her first child on 23. She states she is formula feeding.  Reports adequate rest, help at home (lives with her fiance and his mom).  Denies concerns with depression or anxiety at this time (states she wouldn't take medicine anyway and attributes it to \"stress.\").      Seems to imply she is unsure she wants contraception. States repeatedly that she just needs to talk with her fiance and decide.       ROS:   General: denies fever / chills  HEENT: denies sore throat:  CV: denies chest pain:  Repiratory: denies shortness of breath  GI: denies abdominal pain  : denies dysuria:    PFSH:  I personally reviewed the past medical and surgical histories.     Social History     Tobacco Use    Smoking status: Never     Passive exposure: Yes    Smokeless tobacco: Never   Vaping Use    Vaping Use: Former    Substances: Nicotine    Devices: Disposable   Substance Use Topics    Alcohol use: No    Drug use: No       Social History     Substance and Sexual Activity   Sexual Activity Yes    Partners: Male    Birth control/protection: None        ALLERGIES / REACTIONS:  Allergies   Allergen Reactions    Horse Allergy Itching    Pollen Extract Runny Nose     Runny nose                           PHYSICAL EXAMINATION:  Vital Signs:   Vitals:    23 1101   BP: 104/62   BP Location: Left arm   Patient Position: Sitting   BP Cuff Size: Adult   Weight: 161 lb 6.4 oz   Height: 5' 4\"     Body mass index is 27.7 kg/m².    Gen: appears well, NAD  Respiratory: normal effort    Exam " "deferred    ASSESSMENT AND PLAN:  17 y.o.    1. Encounter for IUD insertion   - IUD not inserted as pt declines today  - Consent for all Surgical, Special Diagnostic or Therapeutic Procedures    2. Encounter for counseling regarding contraception   - 20 minutes spent with patient reviewing her desires and understanding   - discussed recommended pregnancy interval of 12-18 mo   - pt is having unprotected intercourse just 2 mo pp and does not seem fully aware that she could easily get pregnant   - discussed the difficulties both physically and mentally to having 2 pregnancies/deliveries in <1 yr   - discussed that if she is unsure about IUD, then if she is on menses today, she could consider depo shot which would provide 3-mo protection while she \"discusses\" plans and desires with fiance. She declines this repeatedly stating she is \"not sure\" and wants to discuss with fiance   - discussed alternatives such as pill, implant, IUD    - pt did NOT ask many questions about contraception, pregnancy interval or anything.  She repeatedly stated she did not want something today and she just wants to think about things   - denies suicidal or homicidal ideations. Denies concerns with depression or anxiety at this time     - encounter was in general unusual and unclear whether pt has a clear understanding of recommendations or options     - high risk for unplanned pregnancy     - encouraged pt to schedule IUD appt in 2 weeks and to abstain just to lock in appt now if desired as it is easier to cancel than make appt sometimes.  She did schedule a rpt appt for  to reassess.        Aicha Vasques D.O.      "

## 2023-07-20 ENCOUNTER — APPOINTMENT (OUTPATIENT)
Dept: RADIOLOGY | Facility: MEDICAL CENTER | Age: 18
End: 2023-07-20
Attending: EMERGENCY MEDICINE
Payer: MEDICAID

## 2023-07-20 ENCOUNTER — HOSPITAL ENCOUNTER (EMERGENCY)
Facility: MEDICAL CENTER | Age: 18
End: 2023-07-20
Attending: EMERGENCY MEDICINE
Payer: MEDICAID

## 2023-07-20 VITALS
DIASTOLIC BLOOD PRESSURE: 78 MMHG | WEIGHT: 164.02 LBS | SYSTOLIC BLOOD PRESSURE: 125 MMHG | HEART RATE: 90 BPM | TEMPERATURE: 97.2 F | BODY MASS INDEX: 28 KG/M2 | HEIGHT: 64 IN | OXYGEN SATURATION: 99 % | RESPIRATION RATE: 18 BRPM

## 2023-07-20 DIAGNOSIS — N39.0 ACUTE UTI: ICD-10-CM

## 2023-07-20 LAB
ALBUMIN SERPL BCP-MCNC: 4.4 G/DL (ref 3.2–4.9)
ALBUMIN/GLOB SERPL: 1.6 G/DL
ALP SERPL-CCNC: 93 U/L (ref 30–99)
ALT SERPL-CCNC: 19 U/L (ref 2–50)
ANION GAP SERPL CALC-SCNC: 9 MMOL/L (ref 7–16)
APPEARANCE UR: CLEAR
AST SERPL-CCNC: 15 U/L (ref 12–45)
BACTERIA #/AREA URNS HPF: ABNORMAL /HPF
BASOPHILS # BLD AUTO: 0.6 % (ref 0–1.8)
BASOPHILS # BLD: 0.04 K/UL (ref 0–0.12)
BILIRUB SERPL-MCNC: <0.2 MG/DL (ref 0.1–1.5)
BILIRUB UR QL STRIP.AUTO: NEGATIVE
BUN SERPL-MCNC: 11 MG/DL (ref 8–22)
CALCIUM ALBUM COR SERPL-MCNC: 8.7 MG/DL (ref 8.5–10.5)
CALCIUM SERPL-MCNC: 9 MG/DL (ref 8.5–10.5)
CHLORIDE SERPL-SCNC: 107 MMOL/L (ref 96–112)
CO2 SERPL-SCNC: 23 MMOL/L (ref 20–33)
COLOR UR: YELLOW
CREAT SERPL-MCNC: 0.54 MG/DL (ref 0.5–1.4)
EOSINOPHIL # BLD AUTO: 0.2 K/UL (ref 0–0.51)
EOSINOPHIL NFR BLD: 3 % (ref 0–6.9)
EPI CELLS #/AREA URNS HPF: ABNORMAL /HPF
ERYTHROCYTE [DISTWIDTH] IN BLOOD BY AUTOMATED COUNT: 44.8 FL (ref 35.9–50)
GFR SERPLBLD CREATININE-BSD FMLA CKD-EPI: 135 ML/MIN/1.73 M 2
GLOBULIN SER CALC-MCNC: 2.8 G/DL (ref 1.9–3.5)
GLUCOSE SERPL-MCNC: 93 MG/DL (ref 65–99)
GLUCOSE UR STRIP.AUTO-MCNC: NEGATIVE MG/DL
HCG SERPL QL: NEGATIVE
HCT VFR BLD AUTO: 35.8 % (ref 37–47)
HGB BLD-MCNC: 11.1 G/DL (ref 12–16)
HYALINE CASTS #/AREA URNS LPF: ABNORMAL /LPF
IMM GRANULOCYTES # BLD AUTO: 0.02 K/UL (ref 0–0.11)
IMM GRANULOCYTES NFR BLD AUTO: 0.3 % (ref 0–0.9)
KETONES UR STRIP.AUTO-MCNC: NEGATIVE MG/DL
LEUKOCYTE ESTERASE UR QL STRIP.AUTO: ABNORMAL
LIPASE SERPL-CCNC: 45 U/L (ref 11–82)
LYMPHOCYTES # BLD AUTO: 2.22 K/UL (ref 1–4.8)
LYMPHOCYTES NFR BLD: 33.4 % (ref 22–41)
MCH RBC QN AUTO: 25.5 PG (ref 27–33)
MCHC RBC AUTO-ENTMCNC: 31 G/DL (ref 32.2–35.5)
MCV RBC AUTO: 82.3 FL (ref 81.4–97.8)
MICRO URNS: ABNORMAL
MONOCYTES # BLD AUTO: 0.38 K/UL (ref 0–0.85)
MONOCYTES NFR BLD AUTO: 5.7 % (ref 0–13.4)
NEUTROPHILS # BLD AUTO: 3.78 K/UL (ref 1.82–7.42)
NEUTROPHILS NFR BLD: 57 % (ref 44–72)
NITRITE UR QL STRIP.AUTO: NEGATIVE
NRBC # BLD AUTO: 0 K/UL
NRBC BLD-RTO: 0 /100 WBC (ref 0–0.2)
PH UR STRIP.AUTO: 7.5 [PH] (ref 5–8)
PLATELET # BLD AUTO: 261 K/UL (ref 164–446)
PMV BLD AUTO: 13.5 FL (ref 9–12.9)
POTASSIUM SERPL-SCNC: 3.9 MMOL/L (ref 3.6–5.5)
PROT SERPL-MCNC: 7.2 G/DL (ref 6–8.2)
PROT UR QL STRIP: NEGATIVE MG/DL
RBC # BLD AUTO: 4.35 M/UL (ref 4.2–5.4)
RBC # URNS HPF: ABNORMAL /HPF
RBC UR QL AUTO: NEGATIVE
SODIUM SERPL-SCNC: 139 MMOL/L (ref 135–145)
SP GR UR STRIP.AUTO: 1.01
UROBILINOGEN UR STRIP.AUTO-MCNC: 0.2 MG/DL
WBC # BLD AUTO: 6.6 K/UL (ref 4.8–10.8)
WBC #/AREA URNS HPF: ABNORMAL /HPF

## 2023-07-20 PROCEDURE — A9270 NON-COVERED ITEM OR SERVICE: HCPCS | Mod: UD | Performed by: EMERGENCY MEDICINE

## 2023-07-20 PROCEDURE — 85025 COMPLETE CBC W/AUTO DIFF WBC: CPT

## 2023-07-20 PROCEDURE — 99284 EMERGENCY DEPT VISIT MOD MDM: CPT

## 2023-07-20 PROCEDURE — 36415 COLL VENOUS BLD VENIPUNCTURE: CPT

## 2023-07-20 PROCEDURE — 700102 HCHG RX REV CODE 250 W/ 637 OVERRIDE(OP): Mod: UD | Performed by: EMERGENCY MEDICINE

## 2023-07-20 PROCEDURE — 81001 URINALYSIS AUTO W/SCOPE: CPT

## 2023-07-20 PROCEDURE — 83690 ASSAY OF LIPASE: CPT

## 2023-07-20 PROCEDURE — 80053 COMPREHEN METABOLIC PANEL: CPT

## 2023-07-20 PROCEDURE — 84703 CHORIONIC GONADOTROPIN ASSAY: CPT

## 2023-07-20 PROCEDURE — 74018 RADEX ABDOMEN 1 VIEW: CPT

## 2023-07-20 RX ORDER — CEPHALEXIN 500 MG/1
500 CAPSULE ORAL ONCE
Status: COMPLETED | OUTPATIENT
Start: 2023-07-20 | End: 2023-07-20

## 2023-07-20 RX ORDER — CEPHALEXIN 500 MG/1
500 CAPSULE ORAL 4 TIMES DAILY
Qty: 40 CAPSULE | Refills: 0 | Status: ACTIVE | OUTPATIENT
Start: 2023-07-20 | End: 2023-07-30

## 2023-07-20 RX ADMIN — CEPHALEXIN 500 MG: 500 CAPSULE ORAL at 21:43

## 2023-07-20 ASSESSMENT — PAIN DESCRIPTION - DESCRIPTORS: DESCRIPTORS: CRAMPING;ACHING

## 2023-07-20 ASSESSMENT — FIBROSIS 4 INDEX: FIB4 SCORE: 0.58

## 2023-07-21 NOTE — ED PROVIDER NOTES
"ED Provider Note    CHIEF COMPLAINT  Chief Complaint   Patient presents with    Abdominal Pain     Pt with left lower quadrant pain for the last few weeks,  patient with nausea and states that she had one episode of diarrhea       EXTERNAL RECORDS REVIEWED  None    HPI/ROS  LIMITATION TO HISTORY   None  OUTSIDE HISTORIAN(S):  None    Humaira Mejia is a 17 y.o. female who presents here for evaluation of left lower abdominal pain.  Patient states she has had this pain intermittently over the last few weeks.  Patient states that she did have 1 episode of diarrhea yesterday, but has had very irregular and hard bowel movements often.  She has no chest pain, shortness breath, or back pain.  Patient states she has no dysuria, urgency or frequency.    PAST MEDICAL HISTORY   has a past medical history of Hiatal hernia and Patient denies medical problems.    SURGICAL HISTORY  patient denies any surgical history    FAMILY HISTORY  Family History   Problem Relation Age of Onset    No Known Problems Mother     No Known Problems Father     No Known Problems Sister     No Known Problems Brother        SOCIAL HISTORY  Social History     Tobacco Use    Smoking status: Never     Passive exposure: Yes    Smokeless tobacco: Never   Vaping Use    Vaping Use: Former    Substances: Nicotine    Devices: Disposable   Substance and Sexual Activity    Alcohol use: No    Drug use: No    Sexual activity: Yes     Partners: Male     Birth control/protection: None       CURRENT MEDICATIONS  Home Medications    **Home medications have not yet been reviewed for this encounter**         ALLERGIES  Allergies   Allergen Reactions    Horse Allergy Itching    Pollen Extract Runny Nose     Runny nose       PHYSICAL EXAM  VITAL SIGNS: /79   Pulse 94   Temp 36.7 °C (98 °F) (Temporal)   Resp 17   Ht 1.626 m (5' 4\")   Wt 74.4 kg (164 lb 0.4 oz)   LMP 07/04/2023 (Exact Date)   SpO2 100%   Breastfeeding No   BMI 28.15 kg/m²    Constitutional: Well " developed, well nourished. No acute distress.  HEENT: Normocephalic, atraumatic. Posterior pharynx clear and moist.  Eyes:  EOMI. Normal sclera.  Neck: Supple, Full range of motion, nontender.  Chest/Pulmonary: clear to ausculation. Symmetrical expansion.   Cardio: Regular rate and rhythm with no murmur.   Abdomen:   Left lower quadrant tenderness, mild left upper quadrant tenderness, no guarding, no distention.  Musculoskeletal: No deformity, no edema, neurovascular intact.   Neuro: Clear speech, appropriate, cooperative, cranial nerves II-XII grossly intact.  Psych: Normal mood and affect      DIAGNOSTIC STUDIES / PROCEDURES  EKG  Results for orders placed or performed during the hospital encounter of 07/20/23   CBC WITH DIFFERENTIAL   Result Value Ref Range    WBC 6.6 4.8 - 10.8 K/uL    RBC 4.35 4.20 - 5.40 M/uL    Hemoglobin 11.1 (L) 12.0 - 16.0 g/dL    Hematocrit 35.8 (L) 37.0 - 47.0 %    MCV 82.3 81.4 - 97.8 fL    MCH 25.5 (L) 27.0 - 33.0 pg    MCHC 31.0 (L) 32.2 - 35.5 g/dL    RDW 44.8 35.9 - 50.0 fL    Platelet Count 261 164 - 446 K/uL    MPV 13.5 (H) 9.0 - 12.9 fL    Neutrophils-Polys 57.00 44.00 - 72.00 %    Lymphocytes 33.40 22.00 - 41.00 %    Monocytes 5.70 0.00 - 13.40 %    Eosinophils 3.00 0.00 - 6.90 %    Basophils 0.60 0.00 - 1.80 %    Immature Granulocytes 0.30 0.00 - 0.90 %    Nucleated RBC 0.00 0.00 - 0.20 /100 WBC    Neutrophils (Absolute) 3.78 1.82 - 7.42 K/uL    Lymphs (Absolute) 2.22 1.00 - 4.80 K/uL    Monos (Absolute) 0.38 0.00 - 0.85 K/uL    Eos (Absolute) 0.20 0.00 - 0.51 K/uL    Baso (Absolute) 0.04 0.00 - 0.12 K/uL    Immature Granulocytes (abs) 0.02 0.00 - 0.11 K/uL    NRBC (Absolute) 0.00 K/uL   COMP METABOLIC PANEL   Result Value Ref Range    Sodium 139 135 - 145 mmol/L    Potassium 3.9 3.6 - 5.5 mmol/L    Chloride 107 96 - 112 mmol/L    Co2 23 20 - 33 mmol/L    Anion Gap 9.0 7.0 - 16.0    Glucose 93 65 - 99 mg/dL    Bun 11 8 - 22 mg/dL    Creatinine 0.54 0.50 - 1.40 mg/dL    Calcium  9.0 8.5 - 10.5 mg/dL    Correct Calcium 8.7 8.5 - 10.5 mg/dL    AST(SGOT) 15 12 - 45 U/L    ALT(SGPT) 19 2 - 50 U/L    Alkaline Phosphatase 93 30 - 99 U/L    Total Bilirubin <0.2 0.1 - 1.5 mg/dL    Albumin 4.4 3.2 - 4.9 g/dL    Total Protein 7.2 6.0 - 8.2 g/dL    Globulin 2.8 1.9 - 3.5 g/dL    A-G Ratio 1.6 g/dL   LIPASE   Result Value Ref Range    Lipase 45 11 - 82 U/L   HCG QUAL SERUM   Result Value Ref Range    Beta-Hcg Qualitative Serum Negative Negative   URINALYSIS,CULTURE IF INDICATED    Specimen: Urine   Result Value Ref Range    Color Yellow     Character Clear     Specific Gravity 1.015 <1.035    Ph 7.5 5.0 - 8.0    Glucose Negative Negative mg/dL    Ketones Negative Negative mg/dL    Protein Negative Negative mg/dL    Bilirubin Negative Negative    Urobilinogen, Urine 0.2 Negative    Nitrite Negative Negative    Leukocyte Esterase Moderate (A) Negative    Occult Blood Negative Negative    Micro Urine Req Microscopic    URINE MICROSCOPIC (W/UA)   Result Value Ref Range    WBC 5-10 (A) /hpf    RBC 0-2 /hpf    Bacteria Few (A) None /hpf    Epithelial Cells Moderate (A) /hpf    Hyaline Cast 0-2 /lpf   ESTIMATED GFR   Result Value Ref Range    GFR (CKD-EPI) 135 >60 mL/min/1.73 m 2        RADIOLOGY  I have independently interpreted the diagnostic imaging associated with this visit and am waiting the final reading from the radiologist.   My preliminary interpretation is as follows: See below  Radiologist interpretation:   [id number] VIEW   Final Result         1.  Nonspecific bowel gas pattern in the upper abdomen.            COURSE & MEDICAL DECISION MAKING  Discharged in stable condition    INITIAL ASSESSMENT, COURSE AND PLAN  Care Narrative: This is a 17-year-old female here for evaluation of abdominal pain.  Patient is nontoxic-appearing afebrile comfortable.  At this time, she has a UTI, and will be treated with antibiotics here and as outpatient.    DISPOSITION AND DISCUSSIONS  I have discussed  management of the patient with the following physicians and JACEY's: None none    Discussion of management with other QHP or appropriate source(s): None    Escalation of care considered, and ultimately not performed: None    Barriers to care at this time, including but not limited to: Patient does not have established PCP.     Decision tools and prescription drugs considered including, but not limited to: None.    FINAL DIAGNOSIS  1. Acute UTI  cephALEXin (KEFLEX) 500 MG Cap               Electronically signed by: Richy Ling D.O., 7/20/2023 8:52 PM

## 2023-07-21 NOTE — ED TRIAGE NOTES
.  Chief Complaint   Patient presents with    Abdominal Pain     Pt with left lower quadrant pain for the last few weeks,  patient with nausea and states that she had one episode of diarrhea     .Pt is alert and oriented, speaking in full sentences, follows commands and responds appropriately to questions Resp are even and unlabored.  Skin pink warm and dry     Pt sent to lab and then Pt placed in lobby. Pt educated on triage process. Pt encouraged to alert staff for any changes.

## 2023-08-01 ENCOUNTER — OFFICE VISIT (OUTPATIENT)
Dept: URGENT CARE | Facility: CLINIC | Age: 18
End: 2023-08-01
Payer: MEDICAID

## 2023-08-01 VITALS
OXYGEN SATURATION: 100 % | HEIGHT: 64 IN | HEART RATE: 84 BPM | SYSTOLIC BLOOD PRESSURE: 112 MMHG | RESPIRATION RATE: 18 BRPM | WEIGHT: 164 LBS | TEMPERATURE: 96.8 F | DIASTOLIC BLOOD PRESSURE: 70 MMHG | BODY MASS INDEX: 28 KG/M2

## 2023-08-01 DIAGNOSIS — Z32.01 POSITIVE URINE PREGNANCY TEST: ICD-10-CM

## 2023-08-01 DIAGNOSIS — S80.12XA CONTUSION OF LEFT LEG, INITIAL ENCOUNTER: ICD-10-CM

## 2023-08-01 LAB
POCT INT CON NEG: NEGATIVE
POCT INT CON POS: POSITIVE
POCT URINE PREGNANCY TEST: POSITIVE

## 2023-08-01 PROCEDURE — 3078F DIAST BP <80 MM HG: CPT | Performed by: PHYSICIAN ASSISTANT

## 2023-08-01 PROCEDURE — 81025 URINE PREGNANCY TEST: CPT | Performed by: PHYSICIAN ASSISTANT

## 2023-08-01 PROCEDURE — 3074F SYST BP LT 130 MM HG: CPT | Performed by: PHYSICIAN ASSISTANT

## 2023-08-01 PROCEDURE — 99213 OFFICE O/P EST LOW 20 MIN: CPT | Performed by: PHYSICIAN ASSISTANT

## 2023-08-01 ASSESSMENT — ENCOUNTER SYMPTOMS: FEVER: 0

## 2023-08-01 ASSESSMENT — FIBROSIS 4 INDEX: FIB4 SCORE: 0.25

## 2023-08-01 NOTE — PROGRESS NOTES
"Subjective     Humaira Mejia is a 17 y.o. female who presents with Rash (Lower legs, x \"for awhile now\" ) and Pregnancy Test (Pt states she has had 3 positive tests )    PMH:  has a past medical history of Hiatal hernia and Patient denies medical problems.  MEDS: No current outpatient medications on file.  ALLERGIES:   Allergies   Allergen Reactions    Horse Allergy Itching    Pollen Extract Runny Nose     Runny nose     SURGHX: History reviewed. No pertinent surgical history.  SOCHX:  reports that she has never smoked. She has been exposed to tobacco smoke. She has never used smokeless tobacco. She reports that she does not drink alcohol and does not use drugs.  FH: Reviewed with patient, not pertinent to this visit.           Patient presents with:  Rash: Lower legs, x \"for awhile now\" sometimes bruised but often sore and tender on palpation.  No history of injury. NO taking anything for symptoms.   Pregnancy Test: Pt states she has had 3 positive tests at home, would like \" an official test\".    LMP 7/4/2023.          Rash  This is a new problem. The current episode started more than 1 month ago. The problem has been waxing and waning since onset. The affected locations include the left lower leg. The rash is characterized by bruising, pain and swelling. It is unknown if there was an exposure to a precipitant. Pertinent negatives include no fever. Past treatments include nothing. The treatment provided no relief. There is no history of allergies, asthma, eczema or varicella.       Review of Systems   Constitutional:  Negative for fever.   Skin:  Positive for rash. Negative for itching.   All other systems reviewed and are negative.             Objective     /70   Pulse 84   Temp 36 °C (96.8 °F)   Resp 18   Ht 1.626 m (5' 4\")   Wt 74.4 kg (164 lb)   LMP 07/04/2023 (Exact Date)   SpO2 100%   BMI 28.15 kg/m²      Physical Exam  Vitals and nursing note reviewed.   Constitutional:       General: She is not " in acute distress.     Appearance: Normal appearance. She is well-developed and normal weight. She is not ill-appearing or toxic-appearing.   HENT:      Head: Normocephalic and atraumatic.      Right Ear: Tympanic membrane normal.      Left Ear: Tympanic membrane normal.      Nose: Nose normal.      Mouth/Throat:      Lips: Pink.      Mouth: Mucous membranes are moist.      Pharynx: Oropharynx is clear. Uvula midline.   Eyes:      Extraocular Movements: Extraocular movements intact.      Conjunctiva/sclera: Conjunctivae normal.      Pupils: Pupils are equal, round, and reactive to light.   Cardiovascular:      Rate and Rhythm: Normal rate and regular rhythm.      Pulses: Normal pulses.      Heart sounds: Normal heart sounds.   Pulmonary:      Effort: Pulmonary effort is normal.      Breath sounds: Normal breath sounds.   Abdominal:      General: Bowel sounds are normal.      Palpations: Abdomen is soft.   Musculoskeletal:         General: Normal range of motion.      Cervical back: Normal range of motion and neck supple.   Skin:     General: Skin is warm and dry.      Capillary Refill: Capillary refill takes less than 2 seconds.          Neurological:      General: No focal deficit present.      Mental Status: She is alert and oriented to person, place, and time.      Cranial Nerves: No cranial nerve deficit.      Motor: Motor function is intact.      Coordination: Coordination is intact.      Gait: Gait normal.   Psychiatric:         Mood and Affect: Mood normal.                             Assessment & Plan              1. Positive urine pregnancy test  POCT Pregnancy           2. Contusion of left leg, initial encounter          Patient's urine pregnancy test is positive in clinic today.  Patient's last menstrual period began on July 4, 4 weeks ago.  Patient had a negative pregnancy test in the emergency room on July 20 (12 days ago).    Patient was given a copy of her positive pregnancy test results to take with  her to her primary care provider or to the pregnancy center.    We discussed that her tenderness on her shin is likely a contusion that is resolving.Patient can apply ice as desired.    Differential diagnosis, supportive care, and indications for immediate follow-up discussed with patient.  Instructed to return to clinic or nearest emergency department for any change in condition, further concerns, or worsening of symptoms.    I personally reviewed prior external notes and test results pertinent to today's visit.  I have independently reviewed and interpreted all diagnostics ordered during this urgent care visit.    PT should follow up with PCP in 1-2 days for re-evaluation if symptoms have not improved.      Discussed red flags and reasons to return to UC or ED.      Pt and/or family verbalized understanding of diagnosis and follow up instructions and was offered informational handout on diagnosis.  PT discharged.     Please note that this dictation was created using voice recognition software. I have made every reasonable attempt to correct obvious errors, but I expect that there may be errors of grammar and possibly content that I did not discover before finalizing the note.

## 2023-08-04 ENCOUNTER — HOSPITAL ENCOUNTER (EMERGENCY)
Facility: MEDICAL CENTER | Age: 18
End: 2023-08-04
Attending: EMERGENCY MEDICINE
Payer: MEDICAID

## 2023-08-04 VITALS
HEART RATE: 91 BPM | SYSTOLIC BLOOD PRESSURE: 118 MMHG | BODY MASS INDEX: 27.51 KG/M2 | DIASTOLIC BLOOD PRESSURE: 69 MMHG | OXYGEN SATURATION: 98 % | WEIGHT: 160.27 LBS | TEMPERATURE: 98.1 F | RESPIRATION RATE: 16 BRPM

## 2023-08-04 DIAGNOSIS — L03.818 CELLULITIS OF OTHER SPECIFIED SITE: Primary | ICD-10-CM

## 2023-08-04 PROCEDURE — 99283 EMERGENCY DEPT VISIT LOW MDM: CPT

## 2023-08-04 PROCEDURE — A9270 NON-COVERED ITEM OR SERVICE: HCPCS | Mod: UD | Performed by: EMERGENCY MEDICINE

## 2023-08-04 PROCEDURE — 700102 HCHG RX REV CODE 250 W/ 637 OVERRIDE(OP): Mod: UD | Performed by: EMERGENCY MEDICINE

## 2023-08-04 RX ORDER — ACETAMINOPHEN 500 MG
1000 TABLET ORAL EVERY 6 HOURS PRN
Qty: 20 TABLET | Refills: 0 | Status: SHIPPED | OUTPATIENT
Start: 2023-08-04 | End: 2023-08-08

## 2023-08-04 RX ORDER — CEPHALEXIN 500 MG/1
500 CAPSULE ORAL ONCE
Status: COMPLETED | OUTPATIENT
Start: 2023-08-04 | End: 2023-08-04

## 2023-08-04 RX ORDER — CEPHALEXIN 500 MG/1
500 CAPSULE ORAL 4 TIMES DAILY
Qty: 20 CAPSULE | Refills: 0 | Status: ACTIVE | OUTPATIENT
Start: 2023-08-04 | End: 2023-08-09

## 2023-08-04 RX ADMIN — CEPHALEXIN 500 MG: 500 CAPSULE ORAL at 21:40

## 2023-08-04 ASSESSMENT — LIFESTYLE VARIABLES: DO YOU DRINK ALCOHOL: NO

## 2023-08-04 ASSESSMENT — FIBROSIS 4 INDEX: FIB4 SCORE: 0.25

## 2023-08-05 NOTE — ED NOTES
Pt ambulated from lobby to room 72 without assistance, tolerated well. Pt placed in hospital gown and is now sitting up in bed on monitor with even and unlabored breaths, in no apparent distress at this time. Chart up for ERP.

## 2023-08-05 NOTE — ED NOTES
Pt medicated per MAR. Patient given discharge instructions and verbalizes understanding. Pt waiting on registration.

## 2023-08-05 NOTE — ED PROVIDER NOTES
"ED Provider Note    Scribed for Damion Lombardo by Zachary Gaitan. 8/4/2023  8:50 PM    Primary care provider: Ny Mei D.O.  Means of arrival: Walk-in  History obtained from: Patient  History limited by: None    CHIEF COMPLAINT  Chief Complaint   Patient presents with    Skin Lesion     Lle \"bumps\" x 3 months    Pregnancy     Lmp 7/4 + pregnancy test at      EXTERNAL RECORDS REVIEWED  Other Patient was seen in this ED 7/20/23 for abdominal pain and was ultimately given a prescription for Keflex.    HPI/ROS  LIMITATION TO HISTORY   Select: : None  OUTSIDE HISTORIAN(S):  None.    HPI  Humaira Mejia is a 17 y.o. female who presents to the Emergency Department for evaluation of skin bumps on her left lower extremity which onset 3 months ago. She complains of associated pain to the area when touched, and warmth throughout. She denies any fevers, pain in her ankle, or known injury to trauma. She states her last known period was 7/4/23. She says she was seen at urgent care and was told it was bruised, but her symptoms persisted causing her to present for evaluation today. She denies any other medical problems, and states she's normally healthy otherwise. She states she has not seen her PCP for these complaints.     REVIEW OF SYSTEMS  As above, all other systems reviewed and are negative.   See HPI for further details.     PAST MEDICAL HISTORY   has a past medical history of Hiatal hernia and Patient denies medical problems.    SURGICAL HISTORY  patient denies any surgical history    SOCIAL HISTORY  Social History     Tobacco Use    Smoking status: Never     Passive exposure: Yes    Smokeless tobacco: Never   Vaping Use    Vaping Use: Former    Substances: Nicotine    Devices: Disposable   Substance Use Topics    Alcohol use: No    Drug use: No      Social History     Substance and Sexual Activity   Drug Use No     FAMILY HISTORY  Family History   Problem Relation Age of Onset    No Known Problems Mother     No " Known Problems Father     No Known Problems Sister     No Known Problems Brother      CURRENT MEDICATIONS  Home Medications       Reviewed by Margot Kinsey R.N. (Registered Nurse) on 08/04/23 at 1827  Med List Status: Partial     Medication Last Dose Status        Patient Toni Taking any Medications                         ALLERGIES  Allergies   Allergen Reactions    Horse Allergy Itching    Pollen Extract Runny Nose     Runny nose     PHYSICAL EXAM    VITAL SIGNS:   Vitals:    08/04/23 1814 08/04/23 1824   BP: 115/69    Pulse: (!) 108    Resp: 16    Temp: 36.6 °C (97.8 °F)    TempSrc: Temporal    SpO2: 99%    Weight:  72.7 kg (160 lb 4.4 oz)       Vitals: My interpretation: normotensive, tachycardic, afebrile, not hypoxic    Reinterpretation of vitals: Unchanged.    PE:   Gen: sitting comfortably, speaking clearly, appears in no acute distress   ENT: Mucous membranes moist, posterior pharynx clear, uvula midline, nares patent bilaterally   Neck: Supple, FROM  Pulmonary: Lungs are clear to auscultation bilaterally. No tachypnea  CV:  RRR, no murmur appreciated, pulses 2+ in both upper and lower extremities  Abdomen: soft, NT/ND; no rebound/guarding  : no CVA or suprapubic tenderness   Neuro: A&Ox4 (person, place, time, situation), speech fluent, gait steady, no focal deficits appreciated  Skin: No rash or lesions.  No pallor or jaundice.  No cyanosis.  Warm and dry.   Left lower extremity: There is a small area on the anterior shin the distal lower left extremity that has some mild erythema and warmth concerning for possible cellulitic changes.  All joints have full range of motion and are nontender, pulses intact distally, normal capillary refill, negative Nikolsky sign.    COURSE & MEDICAL DECISION MAKING  Nursing notes, VS, PMSFHx, labs, imaging, EKG reviewed in chart.    ED Observation Status? No; Patient does not meet criteria for ED Observation.     Ddx: Strain, sprain, fracture, dislocation,  cellulitis, ecchymosis    MDM: 8:50 PM Humaira Mejia is a 17 y.o. female who presented with acute, severe pain and warmth to the left lower extremity anterior shin that has been ongoing for 3 months and worsened over the last week.  She saw her PCP who told her it was likely a bruise.  She denies any other systemic symptoms or concomitant symptoms at this time.  She is healthy and takes no medications.  Denies fever.  Arrives here with normal vital signs and is afebrile.  Exam shows a small area of warmth and erythema to the distal anterior shin of the left lower extremity but negative Nikolsky sign and no alarming red flags at this time.  It is very odd that has been there for 3 months but It does have some findings concerning for cellulitis and patient started on Keflex considering her positive pregnancy test, and prescribed Tylenol as needed to help with pain.  She is stable, weightbearing, in no acute distress, tolerating p.o. and very well.,  Discharge with repeat exam being benign and normal vital signs.  She verbalized understand strict return precautions outpatient follow-up plan and is amenable.    ADDITIONAL PROBLEM LIST AND DISPOSITION    I have discussed management of the patient with the following physicians and JACEY's:  None    Discussion of management with other QHP or appropriate source(s): None     Escalation of care considered, and ultimately not performed:blood analysis, diagnostic imaging, and acute inpatient care management, however at this time, the patient is most appropriate for outpatient management    Barriers to care at this time, including but not limited to:  No barriers noted during this encounter .     Decision tools and prescription drugs considered including, but not limited to: Antibiotics Keflex prescription    FINAL IMPRESSION  1. Cellulitis of other specified site Acute      I, Zachary Gaitan (Scribemili), am scribing for, and in the presence of, Damion Lombardo.    Electronically  signed by: Zachary Gaitan (Scribe), 8/4/2023    I, Damion Lombardo personally performed the services described in this documentation, as scribed by Zachary Gaitan in my presence, and it is both accurate and complete.    The note accurately reflects work and decisions made by me.  Damion Lombardo  8/4/2023  10:02 PM

## 2023-08-05 NOTE — ED TRIAGE NOTES
".  Chief Complaint   Patient presents with    Skin Lesion     Lle \"bumps\" x 3 months    Pregnancy     Lmp 7/4 + pregnancy test at    Ambulated to triage. Pt requesting to be checked for her pregnancy. Reports + test at . Has no pregnancy related medical complaints.  "

## 2023-08-05 NOTE — DISCHARGE INSTRUCTIONS
Please take the Keflex antibiotic as directed.  You can take Tylenol as well to help with pain.  I want to call your PCP and have them recheck it in a few days to make sure it is improving.  If it is not improving, go to your PCP for further evaluation and treatment.  You will need to follow-up with OB/GYN eventually regarding her pregnancy.  If you have any further concerns, please come back into the emergency department.  Thank you for coming in today.

## 2023-09-13 ENCOUNTER — APPOINTMENT (OUTPATIENT)
Dept: RADIOLOGY | Facility: IMAGING CENTER | Age: 18
End: 2023-09-13
Payer: MEDICAID

## 2023-09-13 DIAGNOSIS — N91.2 AMENORRHEA: ICD-10-CM

## 2023-09-13 PROCEDURE — 76801 OB US < 14 WKS SINGLE FETUS: CPT | Mod: TC | Performed by: NURSE PRACTITIONER

## 2023-09-20 ENCOUNTER — APPOINTMENT (OUTPATIENT)
Dept: OBGYN | Facility: CLINIC | Age: 18
End: 2023-09-20
Payer: MEDICAID

## 2023-09-20 ENCOUNTER — TELEPHONE (OUTPATIENT)
Dept: OBGYN | Facility: CLINIC | Age: 18
End: 2023-09-20

## 2023-09-20 ENCOUNTER — HOSPITAL ENCOUNTER (OUTPATIENT)
Facility: MEDICAL CENTER | Age: 18
End: 2023-09-20
Payer: MEDICAID

## 2023-09-20 ENCOUNTER — INITIAL PRENATAL (OUTPATIENT)
Dept: OBGYN | Facility: CLINIC | Age: 18
End: 2023-09-20
Payer: MEDICAID

## 2023-09-20 VITALS — BODY MASS INDEX: 26.71 KG/M2 | DIASTOLIC BLOOD PRESSURE: 54 MMHG | WEIGHT: 155.6 LBS | SYSTOLIC BLOOD PRESSURE: 108 MMHG

## 2023-09-20 DIAGNOSIS — Z87.59 HISTORY OF GESTATIONAL HYPERTENSION: ICD-10-CM

## 2023-09-20 DIAGNOSIS — Z34.81 ENCOUNTER FOR SUPERVISION OF OTHER NORMAL PREGNANCY IN FIRST TRIMESTER: ICD-10-CM

## 2023-09-20 PROBLEM — Z34.90 SUPERVISION OF NORMAL PREGNANCY: Status: ACTIVE | Noted: 2023-09-20

## 2023-09-20 PROCEDURE — 3078F DIAST BP <80 MM HG: CPT

## 2023-09-20 PROCEDURE — 87510 GARDNER VAG DNA DIR PROBE: CPT

## 2023-09-20 PROCEDURE — 0500F INITIAL PRENATAL CARE VISIT: CPT

## 2023-09-20 PROCEDURE — 87660 TRICHOMONAS VAGIN DIR PROBE: CPT

## 2023-09-20 PROCEDURE — 87491 CHLMYD TRACH DNA AMP PROBE: CPT

## 2023-09-20 PROCEDURE — 87480 CANDIDA DNA DIR PROBE: CPT

## 2023-09-20 PROCEDURE — 3074F SYST BP LT 130 MM HG: CPT

## 2023-09-20 PROCEDURE — 87591 N.GONORRHOEAE DNA AMP PROB: CPT

## 2023-09-20 ASSESSMENT — EDINBURGH POSTNATAL DEPRESSION SCALE (EPDS)
I HAVE FELT SCARED OR PANICKY FOR NO GOOD REASON: NO, NOT MUCH
I HAVE FELT SAD OR MISERABLE: NOT VERY OFTEN
THE THOUGHT OF HARMING MYSELF HAS OCCURRED TO ME: NEVER
I HAVE BLAMED MYSELF UNNECESSARILY WHEN THINGS WENT WRONG: YES, MOST OF THE TIME
I HAVE LOOKED FORWARD WITH ENJOYMENT TO THINGS: RATHER LESS THAN I USED TO
THINGS HAVE BEEN GETTING ON TOP OF ME: YES, SOMETIMES I HAVEN'T BEEN COPING AS WELL AS USUAL
I HAVE BEEN SO UNHAPPY THAT I HAVE HAD DIFFICULTY SLEEPING: YES, SOMETIMES
I HAVE BEEN SO UNHAPPY THAT I HAVE BEEN CRYING: ONLY OCCASIONALLY
I HAVE BEEN ANXIOUS OR WORRIED FOR NO GOOD REASON: YES, SOMETIMES
TOTAL SCORE: 14
I HAVE BEEN ABLE TO LAUGH AND SEE THE FUNNY SIDE OF THINGS: NOT QUITE SO MUCH NOW

## 2023-09-20 ASSESSMENT — FIBROSIS 4 INDEX: FIB4 SCORE: 0.25

## 2023-09-20 NOTE — TELEPHONE ENCOUNTER
Called pt and informed her that she left her prenatal labs orders and her pregnancy book in the room. Lab orders can be picked up at the . Pt agreed.

## 2023-09-20 NOTE — PROGRESS NOTES
"New OB Visit    Subjective   Humaira Mejia is a 17 y.o.  at 11w1d by LMP c/w US at 10w2d with final LUC of 2024 who presents for prenatal care. Today is her first prenatal visit at [facility].   I have reviewed the patients' medical, surgical, gynecological, obstetrical, social, and family histories, medications and available lab results and pertinent notes are as follows:     x1 ER visit to establish pregnancy  No previous care in this pregnancy.     She has complaints of a runny nose and \"feeling sick.\" Reports absent FM. Denies VB, LOF, or cramping.  Denies dysuria, vaginal DC.      Pt is partnered with and lives with \"Ron.\" FOB is involved and supportive. She is currently not working, no heavy lifting, no chemical exposure.    Pregnancy is planned, but she is uncertain if she wants to keep the pregnancy.     OB History    Para Term  AB Living   2 1 1 0 0 1   SAB IAB Ectopic Molar Multiple Live Births   0 0 0 0 0 1       GYN Hx:  Denies fibroids, ovarian cysts, STDs.   Denies having surgery on her cervix, uterus, or ovaries in the past.    Last pap smear was - N/A - not indicated until age 19.  LMP: 23 (exact date)    OB Hx: ;  x1, hx GHTN, no complications with delivery    History of HSV I or II in self or partner: no  History of STIs in self or partner: no  History of Thyroid problems: no  History of Varicella: no  History of depression or anxiety: yes, hx depression and anxiety. History of overdosing on \"antidepressants and pain killers.\" Is not currently taking any medication as she does not \"trust herself.\" Denies SI/HI today. Is interested in therapy.    Denies use of illicit drugs, ETOH, or tobacco in pregnancy    Past Medical History:   Diagnosis Date    Hiatal hernia     Hypertension     high MARKO towards the end of pregnanci in     Patient denies medical problems       History reviewed. No pertinent surgical history.  Social History     Socioeconomic " History    Marital status: Single   Tobacco Use    Smoking status: Never     Passive exposure: Yes    Smokeless tobacco: Never   Vaping Use    Vaping Use: Former    Substances: Nicotine    Devices: Disposable   Substance and Sexual Activity    Alcohol use: No    Drug use: No    Sexual activity: Yes     Partners: Male     Birth control/protection: None      Family History:   Family History   Problem Relation Age of Onset    No Known Problems Mother     No Known Problems Father     No Known Problems Sister     No Known Problems Brother          Infection Prevention           Objective:   Allergies: Horse allergy and Pollen extract  Objective:/54   Wt 155 lb 9.6 oz      Prenatal Physical:  General Exam:  HEENT: normal    Heart: normal    Thyroid: normal    Lungs: normal    Neurological: normal    Abdomen: normal    Skin: normal    Extremities: normal    Pelvic Exam:  Vulva:  Normal  Uterus (wks):  11  Rectum:  Normal       FHT: 163 BPM via TAUS    EPDS: 14    Lab: No results found for this or any previous visit (from the past 672 hour(s)).    Ultrasound: Reviewed initial scan and estimated date of delivery is 4/9/2024 by LMP c/w US at 10w2d.      Assessment:  --Normal exam at 11w1d  --Size consistent with dates  --FHR positive  Encounter Diagnoses   Name Primary?    Encounter for supervision of other normal pregnancy in first trimester     History of gestational hypertension         Plan:  Reviewed the patients risk factors for this pregnancy and recommend the need for   Complete OB US in: 8-9 weeks - ordered today, slip and instructions given to patient on how to schedule; verbalized understanding.  Additional labs/imaging: baseline pre-eclampsia labs ordered.  Antepartum fetal monitoring requirements: routine at this time  Genetic screening was discussed with literature on Prenatal Screening, Cystic Fibrosis, and SMA provided and the patient plans to:  declined Quad screen  declined carrier testing  accepted  NIPT- ordered today  accepted MSAFP - desires but too early, order at next visit  Discuss importance of adequate water intake, taking PNV, healthy nutritional choices, and avoidence of ETOH/Tobacco/drugs.  Discuss importance of exercise, as well as rest   If has nausea, take Vitamin B6 25mg TID with doxylamine/Unisom 12.5mg at night  Prenatal labs and UDS ordered - lab slip and instructions given  Discussed OB care at [facility] including midwifery care, group practice, and call schedule  GC/CT and vaginal pathogen collected today.    Pap not indicated until age 19.  Pregnancy guide provided and reviewed safe medications in pregnancy page  [facility]  mental health handout given to patient  Offered information regarding  and [facility] - pt declined information  Return precautions reviewed - patient verbalized understanding  Follow up in 4 weeks for next visit and PRN    Orders Placed This Encounter    US-OB 2ND 3RD TRI COMPLETE    Chlamydia/GC, PCR (Genital/Anal swab)    VAGINAL PATHOGENS DNA PANEL    PREGNANCY CENTER STANDARD PRENATAL PANEL    URINE DRUG SCREEN W/CONF (AR)    NIPT FOR FETAL ANEUPLOIDY (PANORAMA) WITH MICRODELETIONS    Comp Metabolic Panel    PROTEIN/CREAT RATIO URINE         Pregnancy Checklist  Prenatal labs: ordered today  EPDS: 14 at NOB  Last Pap: N/A 18 y/o  Quad screen/NIPT/MASFP/Carrier Screen: NIPT ordered today; desires MSAFP but too early, order at next visit  Anatomy US: ordered today  3rd trimester labs:  Tdap:  Flu vaccine: not available at this time  Rhogam:   PP Contraception plan:  Bilateral salpingectomy:  GBS:   Hx C/S: no  IOL plan:      Ilsa Hidalgo CMansiN.M.

## 2023-09-20 NOTE — PROGRESS NOTES
NOB visit   LMP: 07/04/2023 with LUC of   WT: 155.6 lb   BP: 108/54  Good # [phone]  Pt states she has occasional N&V.  Pt also reports has been feeling sick for the past 4 days with runny nose, body aching, and her throat feels weird.    Desires NIPT    EPDS Score: 14 ( provider informed)

## 2023-09-21 LAB
C TRACH DNA GENITAL QL NAA+PROBE: NEGATIVE
CANDIDA DNA VAG QL PROBE+SIG AMP: NEGATIVE
G VAGINALIS DNA VAG QL PROBE+SIG AMP: POSITIVE
N GONORRHOEA DNA GENITAL QL NAA+PROBE: NEGATIVE
SPECIMEN SOURCE: NORMAL
T VAGINALIS DNA VAG QL PROBE+SIG AMP: NEGATIVE

## 2023-09-21 RX ORDER — METRONIDAZOLE 500 MG/1
500 TABLET ORAL 2 TIMES DAILY
Qty: 14 TABLET | Refills: 0 | Status: SHIPPED | OUTPATIENT
Start: 2023-09-21 | End: 2023-10-18

## 2023-09-22 ENCOUNTER — TELEPHONE (OUTPATIENT)
Dept: OBGYN | Facility: CLINIC | Age: 18
End: 2023-09-22
Payer: COMMERCIAL

## 2023-09-22 NOTE — TELEPHONE ENCOUNTER
----- Message from Ilsa Hidalgo C.N.M. sent at 9/21/2023  4:16 PM PDT -----  Please call the patient if she does not see the [place] message. She is + for BV. I've sent flagyl to the pharmacy for her to  ASAP. Please ensure she takes the entire course of abx and does not drink any alcohol while taking. Thank you.    9/22/2023 1004  Left message for pt to call back regarding lab results.     9/25/2023 1355  Left message for pt to call back regarding lab results.     9/26/2023 1524  Left message for pt to call back regarding lab results.     9/26/[id number]  Called pt's mother, Lupe Mejia, who is allowed to discuss tx. Lupe stated that she had picked up the Flagyl and was taking it to Humaira today. Informed Lupe to tell Humaira that she should take the full Tx and advised to take meds with food but not with milk and to avoid alcohol and intercourse while on Tx. Lupe agreed and verbalized understanding.

## 2023-09-27 ENCOUNTER — TELEPHONE (OUTPATIENT)
Dept: OBGYN | Facility: CLINIC | Age: 18
End: 2023-09-27

## 2023-09-27 NOTE — TELEPHONE ENCOUNTER
Called pt. No answer. Left VM for pt to call back.    When pt calls back please inform pt that we have the genetic NIPT testing kits available (we were out last week). Pt can pick it up at the [facility] office.   Pt's [place] form and the NIPT Order will be at the front as well.

## 2023-10-18 ENCOUNTER — HOSPITAL ENCOUNTER (OUTPATIENT)
Dept: LAB | Facility: MEDICAL CENTER | Age: 18
End: 2023-10-18
Attending: NURSE PRACTITIONER
Payer: MEDICAID

## 2023-10-18 ENCOUNTER — HOSPITAL ENCOUNTER (OUTPATIENT)
Dept: LAB | Facility: MEDICAL CENTER | Age: 18
End: 2023-10-18
Payer: MEDICAID

## 2023-10-18 ENCOUNTER — ROUTINE PRENATAL (OUTPATIENT)
Dept: OBGYN | Facility: CLINIC | Age: 18
End: 2023-10-18
Payer: MEDICAID

## 2023-10-18 VITALS — BODY MASS INDEX: 25.92 KG/M2 | WEIGHT: 151 LBS | SYSTOLIC BLOOD PRESSURE: 100 MMHG | DIASTOLIC BLOOD PRESSURE: 76 MMHG

## 2023-10-18 DIAGNOSIS — Z34.81 ENCOUNTER FOR SUPERVISION OF OTHER NORMAL PREGNANCY IN FIRST TRIMESTER: ICD-10-CM

## 2023-10-18 DIAGNOSIS — O09.899 SHORT INTERVAL BETWEEN PREGNANCIES AFFECTING PREGNANCY, ANTEPARTUM: ICD-10-CM

## 2023-10-18 DIAGNOSIS — Z34.80 SUPERVISION OF OTHER NORMAL PREGNANCY, ANTEPARTUM: ICD-10-CM

## 2023-10-18 DIAGNOSIS — Z91.89 HISTORY OF DRUG OVERDOSE: ICD-10-CM

## 2023-10-18 LAB
ABO GROUP BLD: NORMAL
ALBUMIN SERPL BCP-MCNC: 3.6 G/DL (ref 3.2–4.9)
ALBUMIN/GLOB SERPL: 1.1 G/DL
ALP SERPL-CCNC: 67 U/L (ref 30–99)
ALT SERPL-CCNC: 8 U/L (ref 2–50)
ANION GAP SERPL CALC-SCNC: 12 MMOL/L (ref 7–16)
AST SERPL-CCNC: 11 U/L (ref 12–45)
BILIRUB SERPL-MCNC: 0.4 MG/DL (ref 0.1–1.5)
BLD GP AB SCN SERPL QL: NORMAL
BUN SERPL-MCNC: 8 MG/DL (ref 8–22)
CALCIUM ALBUM COR SERPL-MCNC: 9 MG/DL (ref 8.5–10.5)
CALCIUM SERPL-MCNC: 8.7 MG/DL (ref 8.5–10.5)
CHLORIDE SERPL-SCNC: 104 MMOL/L (ref 96–112)
CO2 SERPL-SCNC: 21 MMOL/L (ref 20–33)
CREAT SERPL-MCNC: 0.5 MG/DL (ref 0.5–1.4)
CREAT UR-MCNC: 461.91 MG/DL
ERYTHROCYTE [DISTWIDTH] IN BLOOD BY AUTOMATED COUNT: 52.1 FL (ref 35.9–50)
GFR SERPLBLD CREATININE-BSD FMLA CKD-EPI: 138 ML/MIN/1.73 M 2
GLOBULIN SER CALC-MCNC: 3.2 G/DL (ref 1.9–3.5)
GLUCOSE SERPL-MCNC: 79 MG/DL (ref 65–99)
HBV SURFACE AG SER QL: ABNORMAL
HCT VFR BLD AUTO: 36.6 % (ref 37–47)
HCV AB SER QL: ABNORMAL
HGB BLD-MCNC: 11.3 G/DL (ref 12–16)
HIV 1+2 AB+HIV1 P24 AG SERPL QL IA: NORMAL
MCH RBC QN AUTO: 25.9 PG (ref 27–33)
MCHC RBC AUTO-ENTMCNC: 30.9 G/DL (ref 32.2–35.5)
MCV RBC AUTO: 83.9 FL (ref 81.4–97.8)
PLATELET # BLD AUTO: 213 K/UL (ref 164–446)
PMV BLD AUTO: 14.2 FL (ref 9–12.9)
POTASSIUM SERPL-SCNC: 3.5 MMOL/L (ref 3.6–5.5)
PROT SERPL-MCNC: 6.8 G/DL (ref 6–8.2)
PROT UR-MCNC: 48 MG/DL (ref 0–15)
PROT/CREAT UR: 104 MG/G (ref 10–107)
RBC # BLD AUTO: 4.36 M/UL (ref 4.2–5.4)
RH BLD: NORMAL
RUBV AB SER QL: 10.9 IU/ML
SODIUM SERPL-SCNC: 137 MMOL/L (ref 135–145)
T PALLIDUM AB SER QL IA: ABNORMAL
WBC # BLD AUTO: 7.7 K/UL (ref 4.8–10.8)

## 2023-10-18 PROCEDURE — 3074F SYST BP LT 130 MM HG: CPT | Performed by: NURSE PRACTITIONER

## 2023-10-18 PROCEDURE — 86901 BLOOD TYPING SEROLOGIC RH(D): CPT

## 2023-10-18 PROCEDURE — 86900 BLOOD TYPING SEROLOGIC ABO: CPT

## 2023-10-18 PROCEDURE — 87340 HEPATITIS B SURFACE AG IA: CPT

## 2023-10-18 PROCEDURE — 87389 HIV-1 AG W/HIV-1&-2 AB AG IA: CPT

## 2023-10-18 PROCEDURE — 90471 IMMUNIZATION ADMIN: CPT | Performed by: NURSE PRACTITIONER

## 2023-10-18 PROCEDURE — 82570 ASSAY OF URINE CREATININE: CPT

## 2023-10-18 PROCEDURE — 86803 HEPATITIS C AB TEST: CPT

## 2023-10-18 PROCEDURE — 84156 ASSAY OF PROTEIN URINE: CPT

## 2023-10-18 PROCEDURE — 0502F SUBSEQUENT PRENATAL CARE: CPT | Performed by: NURSE PRACTITIONER

## 2023-10-18 PROCEDURE — 86780 TREPONEMA PALLIDUM: CPT

## 2023-10-18 PROCEDURE — 80307 DRUG TEST PRSMV CHEM ANLYZR: CPT

## 2023-10-18 PROCEDURE — 36415 COLL VENOUS BLD VENIPUNCTURE: CPT

## 2023-10-18 PROCEDURE — 86762 RUBELLA ANTIBODY: CPT

## 2023-10-18 PROCEDURE — 85027 COMPLETE CBC AUTOMATED: CPT

## 2023-10-18 PROCEDURE — 80053 COMPREHEN METABOLIC PANEL: CPT

## 2023-10-18 PROCEDURE — 3078F DIAST BP <80 MM HG: CPT | Performed by: NURSE PRACTITIONER

## 2023-10-18 PROCEDURE — 90686 IIV4 VACC NO PRSV 0.5 ML IM: CPT | Performed by: NURSE PRACTITIONER

## 2023-10-18 PROCEDURE — 87086 URINE CULTURE/COLONY COUNT: CPT

## 2023-10-18 PROCEDURE — 86850 RBC ANTIBODY SCREEN: CPT

## 2023-10-18 PROCEDURE — 82105 ALPHA-FETOPROTEIN SERUM: CPT

## 2023-10-18 RX ORDER — ASPIRIN 81 MG/1
81 TABLET, CHEWABLE ORAL DAILY
Qty: 100 TABLET | Refills: 0 | Status: SHIPPED | OUTPATIENT
Start: 2023-10-18 | End: 2023-11-16 | Stop reason: SDUPTHER

## 2023-10-18 ASSESSMENT — FIBROSIS 4 INDEX: FIB4 SCORE: 0.25

## 2023-10-18 NOTE — PROGRESS NOTES
Pt. Here for OB/FU.   Reports no FM.   Pt. Denies VB, LOF, or UC's.   Flu given.   Chaperone offered and indicated.   Pt states she had some bleeding last week.

## 2023-10-20 LAB
# FETUSES US: NORMAL
AFP MOM SERPL: 0.53
AFP SERPL-MCNC: 16 NG/ML
AGE - REPORTED: 20.3 YR
AMPHET CTO UR CFM-MCNC: NEGATIVE NG/ML
BACTERIA UR CULT: NORMAL
BARBITURATES CTO UR CFM-MCNC: NEGATIVE NG/ML
BENZODIAZ CTO UR CFM-MCNC: NEGATIVE NG/ML
CANNABINOIDS CTO UR CFM-MCNC: NEGATIVE NG/ML
COCAINE CTO UR CFM-MCNC: NEGATIVE NG/ML
CURRENT SMOKER: NO
DRUG COMMENT 753798: NORMAL
FAMILY MEMBER DISEASES HX: NO
GA METHOD: NORMAL
GA: NORMAL WK
IDDM PATIENT QL: NO
INTEGRATED SCN PATIENT-IMP: NORMAL
METHADONE CTO UR CFM-MCNC: NEGATIVE NG/ML
OPIATES CTO UR CFM-MCNC: NEGATIVE NG/ML
PCP CTO UR CFM-MCNC: NEGATIVE NG/ML
PROPOXYPH CTO UR CFM-MCNC: NEGATIVE NG/ML
SIGNIFICANT IND 70042: NORMAL
SITE SITE: NORMAL
SOURCE SOURCE: NORMAL
SPECIMEN DRAWN SERPL: NORMAL

## 2023-11-16 ENCOUNTER — ROUTINE PRENATAL (OUTPATIENT)
Dept: OBGYN | Facility: CLINIC | Age: 18
End: 2023-11-16
Payer: MEDICAID

## 2023-11-16 ENCOUNTER — APPOINTMENT (OUTPATIENT)
Dept: LAB | Facility: MEDICAL CENTER | Age: 18
End: 2023-11-16
Attending: NURSE PRACTITIONER
Payer: MEDICAID

## 2023-11-16 VITALS — WEIGHT: 157 LBS | SYSTOLIC BLOOD PRESSURE: 82 MMHG | DIASTOLIC BLOOD PRESSURE: 68 MMHG | BODY MASS INDEX: 26.95 KG/M2

## 2023-11-16 DIAGNOSIS — Z34.82 ENCOUNTER FOR SUPERVISION OF OTHER NORMAL PREGNANCY, SECOND TRIMESTER: ICD-10-CM

## 2023-11-16 PROCEDURE — 3074F SYST BP LT 130 MM HG: CPT | Performed by: NURSE PRACTITIONER

## 2023-11-16 PROCEDURE — 0502F SUBSEQUENT PRENATAL CARE: CPT | Performed by: NURSE PRACTITIONER

## 2023-11-16 PROCEDURE — 3078F DIAST BP <80 MM HG: CPT | Performed by: NURSE PRACTITIONER

## 2023-11-16 RX ORDER — ASPIRIN 81 MG/1
81 TABLET, CHEWABLE ORAL DAILY
Qty: 100 TABLET | Refills: 1 | Status: SHIPPED | OUTPATIENT
Start: 2023-11-16 | End: 2024-01-16 | Stop reason: SDUPTHER

## 2023-11-16 ASSESSMENT — FIBROSIS 4 INDEX: FIB4 SCORE: 0.35
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
## 2023-11-16 NOTE — PROGRESS NOTES
Pt. Here for OB/FU.   Reports Good FM.   Pt. Denies VB, LOF, or UC's.   Chaperone offered and indicated.   US on 11/22.   New NIPT kit/form given today.   Pt states she had a pain in her back/legs that would not allow her to get up and move. Took tylenol and this helped.

## 2023-11-22 ENCOUNTER — APPOINTMENT (OUTPATIENT)
Dept: RADIOLOGY | Facility: IMAGING CENTER | Age: 18
End: 2023-11-22
Payer: MEDICAID

## 2023-11-22 DIAGNOSIS — Z34.81 ENCOUNTER FOR SUPERVISION OF OTHER NORMAL PREGNANCY IN FIRST TRIMESTER: ICD-10-CM

## 2023-11-22 PROCEDURE — 76805 OB US >/= 14 WKS SNGL FETUS: CPT | Mod: TC

## 2023-12-08 ENCOUNTER — HOSPITAL ENCOUNTER (EMERGENCY)
Facility: MEDICAL CENTER | Age: 18
End: 2023-12-08
Payer: MEDICAID

## 2023-12-08 ENCOUNTER — HOSPITAL ENCOUNTER (EMERGENCY)
Facility: MEDICAL CENTER | Age: 18
End: 2023-12-08
Attending: OBSTETRICS & GYNECOLOGY | Admitting: OBSTETRICS & GYNECOLOGY
Payer: MEDICAID

## 2023-12-08 VITALS
WEIGHT: 157 LBS | HEIGHT: 64 IN | SYSTOLIC BLOOD PRESSURE: 113 MMHG | RESPIRATION RATE: 18 BRPM | BODY MASS INDEX: 26.8 KG/M2 | HEART RATE: 90 BPM | TEMPERATURE: 97.5 F | OXYGEN SATURATION: 97 % | DIASTOLIC BLOOD PRESSURE: 72 MMHG

## 2023-12-08 VITALS
HEART RATE: 86 BPM | SYSTOLIC BLOOD PRESSURE: 113 MMHG | RESPIRATION RATE: 18 BRPM | DIASTOLIC BLOOD PRESSURE: 74 MMHG | TEMPERATURE: 97 F | OXYGEN SATURATION: 99 %

## 2023-12-08 LAB
APPEARANCE UR: ABNORMAL
COLOR UR AUTO: YELLOW
GLUCOSE UR QL STRIP.AUTO: NEGATIVE MG/DL
KETONES UR QL STRIP.AUTO: ABNORMAL MG/DL
LEUKOCYTE ESTERASE UR QL STRIP.AUTO: NEGATIVE
NITRITE UR QL STRIP.AUTO: NEGATIVE
PH UR STRIP.AUTO: 5.5 [PH] (ref 5–8)
PROT UR QL STRIP: NEGATIVE MG/DL
RBC UR QL AUTO: NEGATIVE
SP GR UR STRIP.AUTO: 1.02 (ref 1–1.03)

## 2023-12-08 PROCEDURE — 81002 URINALYSIS NONAUTO W/O SCOPE: CPT

## 2023-12-08 PROCEDURE — 302449 STATCHG TRIAGE ONLY (STATISTIC)

## 2023-12-08 PROCEDURE — A9270 NON-COVERED ITEM OR SERVICE: HCPCS | Mod: UD | Performed by: NURSE PRACTITIONER

## 2023-12-08 PROCEDURE — 700102 HCHG RX REV CODE 250 W/ 637 OVERRIDE(OP): Mod: UD | Performed by: NURSE PRACTITIONER

## 2023-12-08 RX ORDER — ACETAMINOPHEN 500 MG
1000 TABLET ORAL ONCE
Status: COMPLETED | OUTPATIENT
Start: 2023-12-08 | End: 2023-12-08

## 2023-12-08 RX ADMIN — ACETAMINOPHEN 1000 MG: 500 TABLET ORAL at 03:48

## 2023-12-08 ASSESSMENT — PAIN SCALES - GENERAL: PAINLEVEL: 2

## 2023-12-08 ASSESSMENT — FIBROSIS 4 INDEX: FIB4 SCORE: 0.35
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
## 2023-12-08 NOTE — PROGRESS NOTES
0455  labor precautions reviewed, adequate hydration reinforced, fiber encourage, all questions answered. Pt discharged home with family.  0445 pt reports feeling better, toco shows no uterine irritability, pt reports feeling ready to discharge home. Orders received to discharge pt home. from VIOLET Hoang.    0334 call to VIOLET Hoang. Orders received to give pt 1mg PO tylenol. PO hydrate and reassess pt's pain in one hour, with continuous toco monitoring.   0253 pt presents to triage with complaints of pain in groin, alternating sides this evening. Reports no bleeding or leaking of fluid.

## 2024-01-16 ENCOUNTER — ROUTINE PRENATAL (OUTPATIENT)
Dept: OBGYN | Facility: CLINIC | Age: 19
End: 2024-01-16
Payer: MEDICAID

## 2024-01-16 VITALS — BODY MASS INDEX: 29.01 KG/M2 | DIASTOLIC BLOOD PRESSURE: 68 MMHG | WEIGHT: 169 LBS | SYSTOLIC BLOOD PRESSURE: 102 MMHG

## 2024-01-16 DIAGNOSIS — Z34.80 SUPERVISION OF OTHER NORMAL PREGNANCY, ANTEPARTUM: ICD-10-CM

## 2024-01-16 DIAGNOSIS — F32.A DEPRESSION DURING PREGNANCY IN SECOND TRIMESTER: ICD-10-CM

## 2024-01-16 DIAGNOSIS — O99.342 DEPRESSION DURING PREGNANCY IN SECOND TRIMESTER: ICD-10-CM

## 2024-01-16 PROCEDURE — 3078F DIAST BP <80 MM HG: CPT | Performed by: NURSE PRACTITIONER

## 2024-01-16 PROCEDURE — 0502F SUBSEQUENT PRENATAL CARE: CPT | Performed by: NURSE PRACTITIONER

## 2024-01-16 PROCEDURE — 3074F SYST BP LT 130 MM HG: CPT | Performed by: NURSE PRACTITIONER

## 2024-01-16 RX ORDER — ASPIRIN 81 MG/1
81 TABLET, CHEWABLE ORAL DAILY
Qty: 100 TABLET | Refills: 1 | Status: SHIPPED | OUTPATIENT
Start: 2024-01-16

## 2024-01-16 RX ORDER — PNV NO.95/FERROUS FUM/FOLIC AC 28MG-0.8MG
1 TABLET ORAL DAILY
Qty: 30 TABLET | Refills: 8 | Status: SHIPPED | OUTPATIENT
Start: 2024-01-16

## 2024-01-16 ASSESSMENT — EDINBURGH POSTNATAL DEPRESSION SCALE (EPDS)
I HAVE BEEN ABLE TO LAUGH AND SEE THE FUNNY SIDE OF THINGS: NOT QUITE SO MUCH NOW
I HAVE FELT SAD OR MISERABLE: NOT VERY OFTEN
I HAVE FELT SCARED OR PANICKY FOR NO GOOD REASON: YES, SOMETIMES
TOTAL SCORE: 12
I HAVE BEEN SO UNHAPPY THAT I HAVE BEEN CRYING: ONLY OCCASIONALLY
THINGS HAVE BEEN GETTING ON TOP OF ME: YES, SOMETIMES I HAVEN'T BEEN COPING AS WELL AS USUAL
I HAVE LOOKED FORWARD WITH ENJOYMENT TO THINGS: RATHER LESS THAN I USED TO
I HAVE BLAMED MYSELF UNNECESSARILY WHEN THINGS WENT WRONG: NOT VERY OFTEN
THE THOUGHT OF HARMING MYSELF HAS OCCURRED TO ME: NEVER
I HAVE BEEN ANXIOUS OR WORRIED FOR NO GOOD REASON: YES, SOMETIMES
I HAVE BEEN SO UNHAPPY THAT I HAVE HAD DIFFICULTY SLEEPING: NOT VERY OFTEN

## 2024-01-16 ASSESSMENT — FIBROSIS 4 INDEX: FIB4 SCORE: 0.37
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
## 2024-01-16 NOTE — LETTER
"Count Your Baby's Movements  Another step to a healthy delivery      How Many Weeks Pregnant? 28w    Date to Begin Counting: ASAP !!!              How to use this chart    One way for your physician to keep track of your baby's health is by knowing how often the baby moves (or \"kicks\") in your womb.  You can help your physician to do this by using this chart every day.    Every day, you should see how many hours it takes for your baby to move 10 times.  Start in the morning, as soon as you get up.    First, write down the time your baby moves until you get to 10.  Check off one box every time your baby moves until you get to 10.  Write down the time you finished counting in the last column.  Total how long it took to count up all 10 movements.  Finally, fill in the box that shows how long this took.  After counting 10 movements, you no longer have to count any more that day.  The next morning, just start counting again as soon as you get up.    What should you call a \"movement\"?  It is hard to say, because it will feel different from one mother to another and from one pregnancy to the next.  The important thing is that you count the movements the same way throughout your pregnancy.  If you have more questions, you should ask your physician.    Count carefully every day!  SAMPLE:  Week 28    How many hours did it take to feel 10 movements?       Start  Time     1     2     3     4     5     6     7     8     9     10   Finish Time   Mon 8:20           11:40   Tue Wed               Thu               Fri               Sat               Sun                 IMPORTANT: You should contact your physician if it takes more than two hours for you to feel 10 movements.  Each morning, write down the time and start to count the movements of your baby.  Keep track by checking off one box every time you feel one movement.  When you have felt 10 \"kicks\", write down the time you finished counting in the last column.  " Then fill in the   box (over the check kiana) for the number of hours it took.  Be sure to read the complete instructions on the previous page.

## 2024-01-16 NOTE — PROGRESS NOTES
Pt. Here for OB F/U.   Reports Good FM.   Chaperone offered.   WILL explained and given today.  Tdap offered and desired. Please give at next apt.   BTL not offered.    EPDS : 12  Pt states she has no concerns.   Good # / Phone verified

## 2024-01-30 ENCOUNTER — ROUTINE PRENATAL (OUTPATIENT)
Dept: OBGYN | Facility: CLINIC | Age: 19
End: 2024-01-30
Payer: COMMERCIAL

## 2024-01-30 ENCOUNTER — HOSPITAL ENCOUNTER (OUTPATIENT)
Dept: LAB | Facility: MEDICAL CENTER | Age: 19
End: 2024-01-30
Attending: NURSE PRACTITIONER
Payer: COMMERCIAL

## 2024-01-30 VITALS — BODY MASS INDEX: 29.52 KG/M2 | SYSTOLIC BLOOD PRESSURE: 100 MMHG | WEIGHT: 172 LBS | DIASTOLIC BLOOD PRESSURE: 64 MMHG

## 2024-01-30 DIAGNOSIS — Z34.80 SUPERVISION OF OTHER NORMAL PREGNANCY, ANTEPARTUM: Primary | ICD-10-CM

## 2024-01-30 DIAGNOSIS — Z34.80 SUPERVISION OF OTHER NORMAL PREGNANCY, ANTEPARTUM: ICD-10-CM

## 2024-01-30 LAB
ERYTHROCYTE [DISTWIDTH] IN BLOOD BY AUTOMATED COUNT: 48.4 FL (ref 35.9–50)
GLUCOSE 1H P 50 G GLC PO SERPL-MCNC: 87 MG/DL (ref 70–139)
HCT VFR BLD AUTO: 31.9 % (ref 37–47)
HGB BLD-MCNC: 9.8 G/DL (ref 12–16)
MCH RBC QN AUTO: 25.8 PG (ref 27–33)
MCHC RBC AUTO-ENTMCNC: 30.7 G/DL (ref 32.2–35.5)
MCV RBC AUTO: 83.9 FL (ref 81.4–97.8)
PLATELET # BLD AUTO: 195 K/UL (ref 164–446)
PMV BLD AUTO: 14.3 FL (ref 9–12.9)
RBC # BLD AUTO: 3.8 M/UL (ref 4.2–5.4)
T PALLIDUM AB SER QL IA: NORMAL
WBC # BLD AUTO: 9.5 K/UL (ref 4.8–10.8)

## 2024-01-30 PROCEDURE — 85027 COMPLETE CBC AUTOMATED: CPT

## 2024-01-30 PROCEDURE — 90715 TDAP VACCINE 7 YRS/> IM: CPT | Performed by: NURSE PRACTITIONER

## 2024-01-30 PROCEDURE — 36415 COLL VENOUS BLD VENIPUNCTURE: CPT

## 2024-01-30 PROCEDURE — 3078F DIAST BP <80 MM HG: CPT | Performed by: NURSE PRACTITIONER

## 2024-01-30 PROCEDURE — 90471 IMMUNIZATION ADMIN: CPT | Performed by: NURSE PRACTITIONER

## 2024-01-30 PROCEDURE — 3074F SYST BP LT 130 MM HG: CPT | Performed by: NURSE PRACTITIONER

## 2024-01-30 PROCEDURE — 82950 GLUCOSE TEST: CPT

## 2024-01-30 PROCEDURE — 86780 TREPONEMA PALLIDUM: CPT

## 2024-01-30 PROCEDURE — 0502F SUBSEQUENT PRENATAL CARE: CPT | Performed by: NURSE PRACTITIONER

## 2024-01-30 ASSESSMENT — FIBROSIS 4 INDEX: FIB4 SCORE: 0.37

## 2024-01-30 NOTE — PROGRESS NOTES
OB follow up   + fetal movement.  No VB, LOF or UC's.  Phone # [phone]  Preferred pharmacy confirmed.  3rd trimester labs done today  TDap today
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
## 2024-01-31 ENCOUNTER — TELEPHONE (OUTPATIENT)
Dept: OBGYN | Facility: CLINIC | Age: 19
End: 2024-01-31
Payer: COMMERCIAL

## 2024-01-31 PROBLEM — O99.019 ANEMIA AFFECTING PREGNANCY, ANTEPARTUM: Status: ACTIVE | Noted: 2024-01-31

## 2024-01-31 RX ORDER — FERROUS SULFATE 325(65) MG
325 TABLET ORAL 2 TIMES DAILY
Qty: 60 TABLET | Refills: 5 | Status: SHIPPED | OUTPATIENT
Start: 2024-01-31

## 2024-02-01 NOTE — TELEPHONE ENCOUNTER
----- Message from JAMI Mistry sent at 1/31/2024 12:55 PM PST -----  Pt to increase her iron rich foods and to take iron at least twice a day. Rx sent or can get over the counter.     1/31/2024  Pt notified of need increase iron rich food intake and to start on Iron supplementation 325 mg BID. To increase water intake to decrease side effects of constipation. Pt agreed and verbalized understanding.  Pharmacy verified with pt.

## 2024-03-20 ENCOUNTER — ROUTINE PRENATAL (OUTPATIENT)
Dept: OBGYN | Facility: CLINIC | Age: 19
End: 2024-03-20
Payer: MEDICAID

## 2024-03-20 ENCOUNTER — HOSPITAL ENCOUNTER (OUTPATIENT)
Facility: MEDICAL CENTER | Age: 19
End: 2024-03-20
Attending: MIDWIFE
Payer: MEDICAID

## 2024-03-20 VITALS — BODY MASS INDEX: 30.9 KG/M2 | WEIGHT: 180 LBS | DIASTOLIC BLOOD PRESSURE: 60 MMHG | SYSTOLIC BLOOD PRESSURE: 102 MMHG

## 2024-03-20 DIAGNOSIS — O99.019 ANEMIA AFFECTING PREGNANCY, ANTEPARTUM: ICD-10-CM

## 2024-03-20 DIAGNOSIS — Z34.80 SUPERVISION OF OTHER NORMAL PREGNANCY, ANTEPARTUM: ICD-10-CM

## 2024-03-20 PROCEDURE — 0502F SUBSEQUENT PRENATAL CARE: CPT | Performed by: MIDWIFE

## 2024-03-20 PROCEDURE — 3074F SYST BP LT 130 MM HG: CPT | Performed by: MIDWIFE

## 2024-03-20 PROCEDURE — 87081 CULTURE SCREEN ONLY: CPT

## 2024-03-20 PROCEDURE — 87150 DNA/RNA AMPLIFIED PROBE: CPT

## 2024-03-20 PROCEDURE — 3078F DIAST BP <80 MM HG: CPT | Performed by: MIDWIFE

## 2024-03-20 ASSESSMENT — EDINBURGH POSTNATAL DEPRESSION SCALE (EPDS)
I HAVE FELT SAD OR MISERABLE: NOT VERY OFTEN
TOTAL SCORE: 12
I HAVE BEEN SO UNHAPPY THAT I HAVE HAD DIFFICULTY SLEEPING: YES, SOMETIMES
I HAVE LOOKED FORWARD WITH ENJOYMENT TO THINGS: RATHER LESS THAN I USED TO
I HAVE BLAMED MYSELF UNNECESSARILY WHEN THINGS WENT WRONG: NOT VERY OFTEN
I HAVE FELT SCARED OR PANICKY FOR NO GOOD REASON: NO, NOT MUCH
I HAVE BEEN SO UNHAPPY THAT I HAVE BEEN CRYING: ONLY OCCASIONALLY
I HAVE BEEN ABLE TO LAUGH AND SEE THE FUNNY SIDE OF THINGS: NOT QUITE SO MUCH NOW
THE THOUGHT OF HARMING MYSELF HAS OCCURRED TO ME: NEVER
I HAVE BEEN ANXIOUS OR WORRIED FOR NO GOOD REASON: YES, SOMETIMES
THINGS HAVE BEEN GETTING ON TOP OF ME: YES, SOMETIMES I HAVEN'T BEEN COPING AS WELL AS USUAL

## 2024-03-20 ASSESSMENT — FIBROSIS 4 INDEX: FIB4 SCORE: 0.4
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
## 2024-03-20 NOTE — PROGRESS NOTES
Pt here today for OB follow up  Pt states she is feeling tired   Reports +FM  Good # [phone]  Pharmacy Confirmed.  Chaperone offered and not indicated.   GBS today.

## 2024-03-22 LAB — GP B STREP DNA SPEC QL NAA+PROBE: NEGATIVE

## 2024-03-23 ENCOUNTER — HOSPITAL ENCOUNTER (EMERGENCY)
Facility: MEDICAL CENTER | Age: 19
End: 2024-03-23
Attending: STUDENT IN AN ORGANIZED HEALTH CARE EDUCATION/TRAINING PROGRAM
Payer: MEDICAID

## 2024-03-23 VITALS
WEIGHT: 186.07 LBS | OXYGEN SATURATION: 96 % | RESPIRATION RATE: 20 BRPM | HEIGHT: 64 IN | BODY MASS INDEX: 31.77 KG/M2 | SYSTOLIC BLOOD PRESSURE: 122 MMHG | TEMPERATURE: 97.5 F | HEART RATE: 75 BPM | DIASTOLIC BLOOD PRESSURE: 69 MMHG

## 2024-03-23 DIAGNOSIS — H66.002 NON-RECURRENT ACUTE SUPPURATIVE OTITIS MEDIA OF LEFT EAR WITHOUT SPONTANEOUS RUPTURE OF TYMPANIC MEMBRANE: ICD-10-CM

## 2024-03-23 DIAGNOSIS — H60.502 ACUTE OTITIS EXTERNA OF LEFT EAR, UNSPECIFIED TYPE: ICD-10-CM

## 2024-03-23 DIAGNOSIS — Z3A.37 37 WEEKS GESTATION OF PREGNANCY: ICD-10-CM

## 2024-03-23 PROCEDURE — 99283 EMERGENCY DEPT VISIT LOW MDM: CPT

## 2024-03-23 PROCEDURE — A9270 NON-COVERED ITEM OR SERVICE: HCPCS | Mod: UD | Performed by: STUDENT IN AN ORGANIZED HEALTH CARE EDUCATION/TRAINING PROGRAM

## 2024-03-23 PROCEDURE — 700102 HCHG RX REV CODE 250 W/ 637 OVERRIDE(OP): Mod: UD | Performed by: STUDENT IN AN ORGANIZED HEALTH CARE EDUCATION/TRAINING PROGRAM

## 2024-03-23 RX ORDER — CEFDINIR 300 MG/1
300 CAPSULE ORAL ONCE
Status: COMPLETED | OUTPATIENT
Start: 2024-03-23 | End: 2024-03-23

## 2024-03-23 RX ORDER — CEFDINIR 300 MG/1
300 CAPSULE ORAL 2 TIMES DAILY
Qty: 13 CAPSULE | Refills: 0 | Status: ACTIVE | OUTPATIENT
Start: 2024-03-23 | End: 2024-03-30

## 2024-03-23 RX ADMIN — CEFDINIR 300 MG: 300 CAPSULE ORAL at 05:51

## 2024-03-23 ASSESSMENT — PAIN DESCRIPTION - PAIN TYPE: TYPE: ACUTE PAIN

## 2024-03-23 ASSESSMENT — FIBROSIS 4 INDEX: FIB4 SCORE: 0.4

## 2024-03-23 NOTE — ED NOTES
Discharge instructions given and discussed, signed copy in chart. Pt verbalized understanding and all questions answered. Copy of prescriptions given to pt. Pt discharged in stable condition on room air. Personal belongings given to patient.

## 2024-03-23 NOTE — ED TRIAGE NOTES
"Chief Complaint   Patient presents with    Ear Pain     10/10 left, throbbing ear pain x1 week. +yellow drainage. Pt endorses jaw pain and difficulty swallowing starting today. Pt taking 500 mg Tylenol with minimal relief. Pt able to maintain oral secretions in triage.     Pregnancy     Pt is 37 weeks pregnant.      Blood Pressure: 129/85, Pulse: 100, Respiration: 18, Temperature: 35.8 °C (96.5 °F), Height: 162.6 cm (5' 4\"), Weight: 84.4 kg (186 lb 1.1 oz), BMI (Calculated): 31.94, BSA (Calculated): 2, Pulse Oximetry: 96 %    Pt is alert, oriented, and follows commands. Pt speaking in full sentences and responds appropriately to questions. No acute distress noted in triage and respirations are even and unlabored.      Pt placed in lobby and educated on triage process. Pt encouraged to alert staff for any changes in condition.   "

## 2024-03-23 NOTE — DISCHARGE INSTRUCTIONS
You are found to have an ear infection.  Please take the antibiotics as prescribed.  Return for any fevers, worsening pain, any other concerns.

## 2024-03-23 NOTE — ED PROVIDER NOTES
ED Provider Note    CHIEF COMPLAINT  Chief Complaint   Patient presents with    Ear Pain     10/10 left, throbbing ear pain x1 week. +yellow drainage. Pt endorses jaw pain and difficulty swallowing starting today. Pt taking 500 mg Tylenol with minimal relief. Pt able to maintain oral secretions in triage.     Pregnancy     Pt is 37 weeks pregnant.        EXTERNAL RECORDS REVIEWED  Outpatient Notes seen at OB/GYN 2024 for prenatal care    HPI/ROS  LIMITATION TO HISTORY   Select: : None  OUTSIDE HISTORIAN(S):  Mother in law    Humaira Mejia is a 18 y.o.  female at approximately 37 weeks gestation who presents for evaluation of left ear pain that has been ongoing for the past week.  She notes that she started to feel like she has pain in the throat.  It hurts when she swallows but she does not have any difficulty swallowing.  She has no headache, fever, chills, nausea, vomiting.  She has been taking Tylenol 1000 mg every 6 hours without much relief.  Her last dose was at 3:30 AM.  3 days ago she started to have some yellow discharge from the left ear.  She notes that she is still having positive fetal movement.  She denies any leakage of fluid or vaginal bleeding.  She otherwise has no complaint.    PAST MEDICAL HISTORY   has a past medical history of Hiatal hernia and Hypertension affecting pregnancy in third trimester.    SURGICAL HISTORY  patient denies any surgical history    FAMILY HISTORY  Family History   Problem Relation Age of Onset    No Known Problems Mother     No Known Problems Father     No Known Problems Sister     No Known Problems Brother        SOCIAL HISTORY  Social History     Tobacco Use    Smoking status: Never     Passive exposure: Yes    Smokeless tobacco: Never   Vaping Use    Vaping Use: Former    Substances: Nicotine    Devices: Disposable   Substance and Sexual Activity    Alcohol use: No    Drug use: No    Sexual activity: Yes     Partners: Male     Birth control/protection:  "None       CURRENT MEDICATIONS  Home Medications       Reviewed by Louisa Perez R.N. (Registered Nurse) on 03/23/24 at 0440  Med List Status: Not Addressed     Medication Last Dose Status   aspirin (ASA) 81 MG Chew Tab chewable tablet  Active   ferrous sulfate 325 (65 Fe) MG tablet  Active   Prenatal Vit-Fe Fumarate-FA (PRENATAL VITAMINS) 28-0.8 MG Tab  Active                    ALLERGIES  Allergies   Allergen Reactions    Horse Allergy Itching    Pollen Extract Runny Nose     Runny nose       PHYSICAL EXAM  VITAL SIGNS: /85   Pulse 100   Temp 35.8 °C (96.5 °F) (Temporal)   Resp 18   Ht 1.626 m (5' 4\")   Wt 84.4 kg (186 lb 1.1 oz)   LMP 07/04/2023 (Exact Date)   SpO2 96%   BMI 31.94 kg/m²      Constitutional: Well developed, Well nourished, No acute respiratory distress, Non-toxic appearance.   HENT: Normocephalic, Atraumatic, Bilateral external ears normal, mild pain with manipulation of the left pinna, scant purulent discharge in the left ear canal with some pain with entrance of the otoscope, the left TM is bulging and erythematous, no obvious perforation noted but limitation is somewhat limited given small amount of purulent drainage, no mastoid tenderness t hpalpation, oropharynx clear, no oral exudates, no uvular deviation, no trismus mucous membranes are moist.  Eyes: Conjunctiva normal, No discharge. No icterus.  Neck: Normal range of motion. Supple.  Cardiovascular: Normal heart rate, Normal rhythm, No murmurs, No rubs, No gallops.   Thorax & Lungs: Clear to auscultation bilaterally, No respiratory distress, No wheezing.  Abdomen: Gravid uterus, non-tender  Skin: Warm, Dry, No erythema, No rash.   Extremities: Intact distal pulses, No edema, No tenderness  Musculoskeletal: Good range of motion in all major joints. Normal gait.  Neurologic: Alert & oriented. CN II-XII intact, No focal deficits noted.   Psych: Alert normal affect     COURSE & MEDICAL DECISION MAKING    ED Observation Status? No; " Patient does not meet criteria for ED Observation.     INITIAL ASSESSMENT, COURSE AND PLAN  Care Narrative:   This is a 18-year-old female at approximately 37 weeks gestation who is presenting for evaluation of left ear pain onset 1 week.  On arrival her vital signs are stable.  On physical exam she appears to have an otitis media and otitis externa on the left.  There is no evidence of trismus.  Her posterior oropharynx is clear without exudates and she is protecting her airway without difficulty.  She is swallowing normal.  There is no mastoid tenderness to palpation to raise concern for mastoiditis.  I did discuss with pharmacy regarding antibiotic selection given risk of bowel issues in  when close to delivery associated with Augmentin.  Given that patient is 4 cm dilated I feel that delivery may be somewhat imminent.  Pharmacy recommends Omnicef.  First dose given here.  Point-of-care ultrasound done and fetal heart tones are 154 and there is positive fetal movement.  Patient has no pregnancy complaints and has no leakage of fluid.  I have advised her to return the emergency room if she develops any fever, worsening pain, vomiting, any other concerns.  She is agreeable to discharge plan with no further questions.        ADDITIONAL PROBLEM LIST  none  DISPOSITION AND DISCUSSIONS  I have discussed management of the patient with the following physicians and JACEY's:  None    Discussion of management with other QHP or appropriate source(s): Pharmacy regarding abx selection      Escalation of care considered, and ultimately not performed:acute inpatient care management, however at this time, the patient is most appropriate for outpatient management    Barriers to care at this time, including but not limited to:  None .     Decision tools and prescription drugs considered including, but not limited to: Antibiotics omnicef after discussion with pharmacy .     The patient will return for new or worsening symptoms  and is stable at the time of discharge.    The patient is referred to a primary physician for blood pressure management, diabetic screening, and for all other preventative health concerns.    DISPOSITION:  Patient will be discharged home in stable condition.    FOLLOW UP:  Ny Mei D.O.  [address]  [place] [address]  [phone]          [facility], Emergency Dept  [address] [facility] [address]  [phone]          OUTPATIENT MEDICATIONS:  Discharge Medication List as of 3/23/2024  5:53 AM        START taking these medications    Details   cefdinir (OMNICEF) 300 MG Cap Take 1 Capsule by mouth 2 times a day for 7 days., Disp-13 Capsule, R-0, Normal               FINAL DIAGNOSIS  1. Non-recurrent acute suppurative otitis media of left ear without spontaneous rupture of tympanic membrane    2. Acute otitis externa of left ear, unspecified type    3. 37 weeks gestation of pregnancy           Electronically signed by: Jenifer Martins M.D., 3/23/2024 5:14 AM

## 2024-03-23 NOTE — ED NOTES
Pt ambulatory to rm w/o assistance. Connected to VS, resting comfortably NAD w/ equal chest rise/fall. Chart up for ERP.

## 2024-03-28 ENCOUNTER — ROUTINE PRENATAL (OUTPATIENT)
Dept: OBGYN | Facility: CLINIC | Age: 19
End: 2024-03-28
Payer: MEDICAID

## 2024-03-28 VITALS — DIASTOLIC BLOOD PRESSURE: 60 MMHG | WEIGHT: 186 LBS | BODY MASS INDEX: 31.93 KG/M2 | SYSTOLIC BLOOD PRESSURE: 104 MMHG

## 2024-03-28 DIAGNOSIS — Z34.80 SUPERVISION OF OTHER NORMAL PREGNANCY, ANTEPARTUM: Primary | ICD-10-CM

## 2024-03-28 PROCEDURE — 0502F SUBSEQUENT PRENATAL CARE: CPT | Performed by: NURSE PRACTITIONER

## 2024-03-28 PROCEDURE — 3074F SYST BP LT 130 MM HG: CPT | Performed by: NURSE PRACTITIONER

## 2024-03-28 PROCEDURE — 3078F DIAST BP <80 MM HG: CPT | Performed by: NURSE PRACTITIONER

## 2024-03-28 ASSESSMENT — FIBROSIS 4 INDEX: FIB4 SCORE: 0.4
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
## 2024-03-28 NOTE — PROGRESS NOTES
Pt here today for OB follow up  Pt states she wants to be checked  Reports +FM  Pharmacy Confirmed.  Chaperone offered and declined.    GBS neg

## 2024-04-01 ENCOUNTER — TELEPHONE (OUTPATIENT)
Dept: OBGYN | Facility: CLINIC | Age: 19
End: 2024-04-01
Payer: MEDICAID

## 2024-04-01 NOTE — TELEPHONE ENCOUNTER
Caller Name: kevyn ma  Call Back Number: [phone]        Returning pts voicemail regarding mychart questions, left voicemail for call back at pt convenience

## 2024-04-02 ENCOUNTER — ANESTHESIA EVENT (OUTPATIENT)
Dept: ANESTHESIOLOGY | Facility: MEDICAL CENTER | Age: 19
End: 2024-04-02
Payer: MEDICAID

## 2024-04-02 ENCOUNTER — HOSPITAL ENCOUNTER (INPATIENT)
Facility: MEDICAL CENTER | Age: 19
LOS: 1 days | End: 2024-04-03
Attending: FAMILY MEDICINE | Admitting: FAMILY MEDICINE
Payer: MEDICAID

## 2024-04-02 ENCOUNTER — ANESTHESIA (OUTPATIENT)
Dept: ANESTHESIOLOGY | Facility: MEDICAL CENTER | Age: 19
End: 2024-04-02
Payer: MEDICAID

## 2024-04-02 ENCOUNTER — ROUTINE PRENATAL (OUTPATIENT)
Dept: OBGYN | Facility: CLINIC | Age: 19
End: 2024-04-02
Payer: MEDICAID

## 2024-04-02 VITALS — DIASTOLIC BLOOD PRESSURE: 64 MMHG | SYSTOLIC BLOOD PRESSURE: 100 MMHG | WEIGHT: 186 LBS | BODY MASS INDEX: 31.93 KG/M2

## 2024-04-02 DIAGNOSIS — Z34.80 SUPERVISION OF OTHER NORMAL PREGNANCY, ANTEPARTUM: Primary | ICD-10-CM

## 2024-04-02 LAB
ABO GROUP BLD: NORMAL
BASOPHILS # BLD AUTO: 0.3 % (ref 0–1.8)
BASOPHILS # BLD: 0.04 K/UL (ref 0–0.12)
BLD GP AB SCN SERPL QL: NORMAL
EOSINOPHIL # BLD AUTO: 0.14 K/UL (ref 0–0.51)
EOSINOPHIL NFR BLD: 1.2 % (ref 0–6.9)
ERYTHROCYTE [DISTWIDTH] IN BLOOD BY AUTOMATED COUNT: 49.4 FL (ref 35.9–50)
HCT VFR BLD AUTO: 30.5 % (ref 37–47)
HGB BLD-MCNC: 9.3 G/DL (ref 12–16)
IMM GRANULOCYTES # BLD AUTO: 0.14 K/UL (ref 0–0.11)
IMM GRANULOCYTES NFR BLD AUTO: 1.2 % (ref 0–0.9)
LYMPHOCYTES # BLD AUTO: 1.7 K/UL (ref 1–4.8)
LYMPHOCYTES NFR BLD: 14.7 % (ref 22–41)
MCH RBC QN AUTO: 23.3 PG (ref 27–33)
MCHC RBC AUTO-ENTMCNC: 30.5 G/DL (ref 32.2–35.5)
MCV RBC AUTO: 76.3 FL (ref 81.4–97.8)
MONOCYTES # BLD AUTO: 0.64 K/UL (ref 0–0.85)
MONOCYTES NFR BLD AUTO: 5.6 % (ref 0–13.4)
NEUTROPHILS # BLD AUTO: 8.87 K/UL (ref 1.82–7.42)
NEUTROPHILS NFR BLD: 77 % (ref 44–72)
NRBC # BLD AUTO: 0.04 K/UL
NRBC BLD-RTO: 0.3 /100 WBC (ref 0–0.2)
PLATELET # BLD AUTO: 229 K/UL (ref 164–446)
RBC # BLD AUTO: 4 M/UL (ref 4.2–5.4)
RH BLD: NORMAL
T PALLIDUM AB SER QL IA: NORMAL
WBC # BLD AUTO: 11.5 K/UL (ref 4.8–10.8)

## 2024-04-02 PROCEDURE — 700101 HCHG RX REV CODE 250: Performed by: STUDENT IN AN ORGANIZED HEALTH CARE EDUCATION/TRAINING PROGRAM

## 2024-04-02 PROCEDURE — 700102 HCHG RX REV CODE 250 W/ 637 OVERRIDE(OP)

## 2024-04-02 PROCEDURE — 86780 TREPONEMA PALLIDUM: CPT

## 2024-04-02 PROCEDURE — 59410 OBSTETRICAL CARE: CPT | Mod: GC | Performed by: FAMILY MEDICINE

## 2024-04-02 PROCEDURE — 700111 HCHG RX REV CODE 636 W/ 250 OVERRIDE (IP)

## 2024-04-02 PROCEDURE — 86900 BLOOD TYPING SEROLOGIC ABO: CPT

## 2024-04-02 PROCEDURE — 3078F DIAST BP <80 MM HG: CPT | Performed by: NURSE PRACTITIONER

## 2024-04-02 PROCEDURE — 59409 OBSTETRICAL CARE: CPT

## 2024-04-02 PROCEDURE — 85025 COMPLETE CBC W/AUTO DIFF WBC: CPT

## 2024-04-02 PROCEDURE — 86850 RBC ANTIBODY SCREEN: CPT

## 2024-04-02 PROCEDURE — 3074F SYST BP LT 130 MM HG: CPT | Performed by: NURSE PRACTITIONER

## 2024-04-02 PROCEDURE — 0HQ9XZZ REPAIR PERINEUM SKIN, EXTERNAL APPROACH: ICD-10-PCS | Performed by: FAMILY MEDICINE

## 2024-04-02 PROCEDURE — A9270 NON-COVERED ITEM OR SERVICE: HCPCS | Performed by: FAMILY MEDICINE

## 2024-04-02 PROCEDURE — A9270 NON-COVERED ITEM OR SERVICE: HCPCS

## 2024-04-02 PROCEDURE — 770002 HCHG ROOM/CARE - OB PRIVATE (112)

## 2024-04-02 PROCEDURE — 700111 HCHG RX REV CODE 636 W/ 250 OVERRIDE (IP): Mod: JZ

## 2024-04-02 PROCEDURE — 304965 HCHG RECOVERY SERVICES

## 2024-04-02 PROCEDURE — 36415 COLL VENOUS BLD VENIPUNCTURE: CPT

## 2024-04-02 PROCEDURE — 700105 HCHG RX REV CODE 258

## 2024-04-02 PROCEDURE — 700102 HCHG RX REV CODE 250 W/ 637 OVERRIDE(OP): Performed by: FAMILY MEDICINE

## 2024-04-02 PROCEDURE — 0502F SUBSEQUENT PRENATAL CARE: CPT | Performed by: NURSE PRACTITIONER

## 2024-04-02 PROCEDURE — 86901 BLOOD TYPING SEROLOGIC RH(D): CPT

## 2024-04-02 PROCEDURE — 303615 HCHG EPIDURAL/SPINAL ANESTHESIA FOR LABOR

## 2024-04-02 RX ORDER — CALCIUM CARBONATE 500 MG/1
1000 TABLET, CHEWABLE ORAL EVERY 6 HOURS PRN
Status: DISCONTINUED | OUTPATIENT
Start: 2024-04-02 | End: 2024-04-03 | Stop reason: HOSPADM

## 2024-04-02 RX ORDER — LIDOCAINE HYDROCHLORIDE AND EPINEPHRINE 15; 5 MG/ML; UG/ML
INJECTION, SOLUTION EPIDURAL
Status: COMPLETED | OUTPATIENT
Start: 2024-04-02 | End: 2024-04-02

## 2024-04-02 RX ORDER — MISOPROSTOL 200 UG/1
800 TABLET ORAL
Status: DISCONTINUED | OUTPATIENT
Start: 2024-04-02 | End: 2024-04-02 | Stop reason: HOSPADM

## 2024-04-02 RX ORDER — ACETAMINOPHEN 500 MG
1000 TABLET ORAL
Status: COMPLETED | OUTPATIENT
Start: 2024-04-02 | End: 2024-04-02

## 2024-04-02 RX ORDER — SODIUM CHLORIDE, SODIUM LACTATE, POTASSIUM CHLORIDE, CALCIUM CHLORIDE 600; 310; 30; 20 MG/100ML; MG/100ML; MG/100ML; MG/100ML
INJECTION, SOLUTION INTRAVENOUS CONTINUOUS
Status: DISCONTINUED | OUTPATIENT
Start: 2024-04-02 | End: 2024-04-02

## 2024-04-02 RX ORDER — ACETAMINOPHEN 500 MG
1000 TABLET ORAL EVERY 6 HOURS PRN
Status: DISCONTINUED | OUTPATIENT
Start: 2024-04-02 | End: 2024-04-03 | Stop reason: HOSPADM

## 2024-04-02 RX ORDER — SODIUM CHLORIDE, SODIUM LACTATE, POTASSIUM CHLORIDE, CALCIUM CHLORIDE 600; 310; 30; 20 MG/100ML; MG/100ML; MG/100ML; MG/100ML
INJECTION, SOLUTION INTRAVENOUS PRN
Status: DISCONTINUED | OUTPATIENT
Start: 2024-04-02 | End: 2024-04-03 | Stop reason: HOSPADM

## 2024-04-02 RX ORDER — OXYTOCIN 10 [USP'U]/ML
10 INJECTION, SOLUTION INTRAMUSCULAR; INTRAVENOUS
Status: DISCONTINUED | OUTPATIENT
Start: 2024-04-02 | End: 2024-04-02 | Stop reason: HOSPADM

## 2024-04-02 RX ORDER — ROPIVACAINE HYDROCHLORIDE 2 MG/ML
INJECTION, SOLUTION EPIDURAL; INFILTRATION; PERINEURAL
Status: COMPLETED
Start: 2024-04-02 | End: 2024-04-02

## 2024-04-02 RX ORDER — SODIUM CHLORIDE, SODIUM LACTATE, POTASSIUM CHLORIDE, AND CALCIUM CHLORIDE .6; .31; .03; .02 G/100ML; G/100ML; G/100ML; G/100ML
1000 INJECTION, SOLUTION INTRAVENOUS
Status: DISCONTINUED | OUTPATIENT
Start: 2024-04-02 | End: 2024-04-02 | Stop reason: HOSPADM

## 2024-04-02 RX ORDER — OXYCODONE HYDROCHLORIDE 5 MG/1
5 TABLET ORAL EVERY 4 HOURS PRN
Status: DISCONTINUED | OUTPATIENT
Start: 2024-04-02 | End: 2024-04-03 | Stop reason: HOSPADM

## 2024-04-02 RX ORDER — SIMETHICONE 125 MG
125 TABLET,CHEWABLE ORAL 4 TIMES DAILY PRN
Status: DISCONTINUED | OUTPATIENT
Start: 2024-04-02 | End: 2024-04-03 | Stop reason: HOSPADM

## 2024-04-02 RX ORDER — ALUMINA, MAGNESIA, AND SIMETHICONE 2400; 2400; 240 MG/30ML; MG/30ML; MG/30ML
30 SUSPENSION ORAL EVERY 6 HOURS PRN
Status: DISCONTINUED | OUTPATIENT
Start: 2024-04-02 | End: 2024-04-02 | Stop reason: HOSPADM

## 2024-04-02 RX ORDER — LIDOCAINE HYDROCHLORIDE 10 MG/ML
20 INJECTION, SOLUTION INFILTRATION; PERINEURAL
Status: DISCONTINUED | OUTPATIENT
Start: 2024-04-02 | End: 2024-04-02 | Stop reason: HOSPADM

## 2024-04-02 RX ORDER — HYDROXYZINE 50 MG/1
50 TABLET, FILM COATED ORAL EVERY 6 HOURS PRN
Status: DISCONTINUED | OUTPATIENT
Start: 2024-04-02 | End: 2024-04-02 | Stop reason: HOSPADM

## 2024-04-02 RX ORDER — ONDANSETRON 2 MG/ML
4 INJECTION INTRAMUSCULAR; INTRAVENOUS EVERY 6 HOURS PRN
Status: DISCONTINUED | OUTPATIENT
Start: 2024-04-02 | End: 2024-04-02 | Stop reason: HOSPADM

## 2024-04-02 RX ORDER — DOCUSATE SODIUM 100 MG/1
100 CAPSULE, LIQUID FILLED ORAL 2 TIMES DAILY PRN
Status: DISCONTINUED | OUTPATIENT
Start: 2024-04-02 | End: 2024-04-03 | Stop reason: HOSPADM

## 2024-04-02 RX ORDER — ROPIVACAINE HYDROCHLORIDE 2 MG/ML
INJECTION, SOLUTION EPIDURAL; INFILTRATION; PERINEURAL CONTINUOUS
Status: DISCONTINUED | OUTPATIENT
Start: 2024-04-02 | End: 2024-04-02

## 2024-04-02 RX ORDER — TERBUTALINE SULFATE 1 MG/ML
0.25 INJECTION, SOLUTION SUBCUTANEOUS
Status: DISCONTINUED | OUTPATIENT
Start: 2024-04-02 | End: 2024-04-02 | Stop reason: HOSPADM

## 2024-04-02 RX ORDER — IBUPROFEN 800 MG/1
800 TABLET ORAL EVERY 8 HOURS PRN
Status: DISCONTINUED | OUTPATIENT
Start: 2024-04-02 | End: 2024-04-03 | Stop reason: HOSPADM

## 2024-04-02 RX ORDER — IBUPROFEN 800 MG/1
800 TABLET ORAL
Status: COMPLETED | OUTPATIENT
Start: 2024-04-02 | End: 2024-04-02

## 2024-04-02 RX ORDER — BISACODYL 10 MG
10 SUPPOSITORY, RECTAL RECTAL PRN
Status: DISCONTINUED | OUTPATIENT
Start: 2024-04-02 | End: 2024-04-03 | Stop reason: HOSPADM

## 2024-04-02 RX ORDER — EPHEDRINE SULFATE 50 MG/ML
5 INJECTION, SOLUTION INTRAVENOUS
Status: DISCONTINUED | OUTPATIENT
Start: 2024-04-02 | End: 2024-04-02 | Stop reason: HOSPADM

## 2024-04-02 RX ORDER — ONDANSETRON 4 MG/1
4 TABLET, ORALLY DISINTEGRATING ORAL EVERY 6 HOURS PRN
Status: DISCONTINUED | OUTPATIENT
Start: 2024-04-02 | End: 2024-04-02 | Stop reason: HOSPADM

## 2024-04-02 RX ORDER — SODIUM CHLORIDE, SODIUM LACTATE, POTASSIUM CHLORIDE, AND CALCIUM CHLORIDE .6; .31; .03; .02 G/100ML; G/100ML; G/100ML; G/100ML
250 INJECTION, SOLUTION INTRAVENOUS PRN
Status: DISCONTINUED | OUTPATIENT
Start: 2024-04-02 | End: 2024-04-02 | Stop reason: HOSPADM

## 2024-04-02 RX ADMIN — ROPIVACAINE HYDROCHLORIDE 200 MG: 2 INJECTION, SOLUTION EPIDURAL; INFILTRATION; PERINEURAL at 12:19

## 2024-04-02 RX ADMIN — SODIUM CHLORIDE, POTASSIUM CHLORIDE, SODIUM LACTATE AND CALCIUM CHLORIDE: 600; 310; 30; 20 INJECTION, SOLUTION INTRAVENOUS at 17:23

## 2024-04-02 RX ADMIN — OXYTOCIN 125 ML/HR: 10 INJECTION, SOLUTION INTRAMUSCULAR; INTRAVENOUS at 20:42

## 2024-04-02 RX ADMIN — OXYTOCIN 125 ML/HR: 10 INJECTION, SOLUTION INTRAMUSCULAR; INTRAVENOUS at 19:31

## 2024-04-02 RX ADMIN — IBUPROFEN 800 MG: 800 TABLET, FILM COATED ORAL at 18:34

## 2024-04-02 RX ADMIN — OXYTOCIN 20 UNITS: 10 INJECTION, SOLUTION INTRAMUSCULAR; INTRAVENOUS at 17:50

## 2024-04-02 RX ADMIN — LIDOCAINE HYDROCHLORIDE,EPINEPHRINE BITARTRATE 3 ML: 15; .005 INJECTION, SOLUTION EPIDURAL; INFILTRATION; INTRACAUDAL; PERINEURAL at 12:17

## 2024-04-02 RX ADMIN — OXYTOCIN 2 MILLI-UNITS/MIN: 10 INJECTION, SOLUTION INTRAMUSCULAR; INTRAVENOUS at 15:43

## 2024-04-02 RX ADMIN — SERTRALINE 50 MG: 50 TABLET, FILM COATED ORAL at 21:26

## 2024-04-02 RX ADMIN — ROPIVACAINE HYDROCHLORIDE 200 MG: 2 INJECTION, SOLUTION EPIDURAL; INFILTRATION at 12:19

## 2024-04-02 RX ADMIN — ACETAMINOPHEN 1000 MG: 500 TABLET, FILM COATED ORAL at 18:34

## 2024-04-02 RX ADMIN — OXYCODONE 5 MG: 5 TABLET ORAL at 21:26

## 2024-04-02 RX ADMIN — SODIUM CHLORIDE, POTASSIUM CHLORIDE, SODIUM LACTATE AND CALCIUM CHLORIDE: 600; 310; 30; 20 INJECTION, SOLUTION INTRAVENOUS at 11:49

## 2024-04-02 ASSESSMENT — LIFESTYLE VARIABLES
CONSUMPTION TOTAL: NEGATIVE
TOTAL SCORE: 0
ALCOHOL_USE: NO
AVERAGE NUMBER OF DAYS PER WEEK YOU HAVE A DRINK CONTAINING ALCOHOL: 0
ON A TYPICAL DAY WHEN YOU DRINK ALCOHOL HOW MANY DRINKS DO YOU HAVE: 0
DOES PATIENT WANT TO STOP DRINKING: NO
HOW MANY TIMES IN THE PAST YEAR HAVE YOU HAD 5 OR MORE DRINKS IN A DAY: 0
HAVE YOU EVER FELT YOU SHOULD CUT DOWN ON YOUR DRINKING: NO
HAVE PEOPLE ANNOYED YOU BY CRITICIZING YOUR DRINKING: NO
EVER HAD A DRINK FIRST THING IN THE MORNING TO STEADY YOUR NERVES TO GET RID OF A HANGOVER: NO
EVER_SMOKED: NEVER
EVER FELT BAD OR GUILTY ABOUT YOUR DRINKING: NO
TOTAL SCORE: 0
TOTAL SCORE: 0

## 2024-04-02 ASSESSMENT — PATIENT HEALTH QUESTIONNAIRE - PHQ9
SUM OF ALL RESPONSES TO PHQ QUESTIONS 1-9: 10
9. THOUGHTS THAT YOU WOULD BE BETTER OFF DEAD, OR OF HURTING YOURSELF: NOT AT ALL
5. POOR APPETITE OR OVEREATING: NOT AT ALL
SUM OF ALL RESPONSES TO PHQ9 QUESTIONS 1 AND 2: 2
3. TROUBLE FALLING OR STAYING ASLEEP OR SLEEPING TOO MUCH: NOT AT ALL
2. FEELING DOWN, DEPRESSED, IRRITABLE, OR HOPELESS: MORE THAN HALF THE DAYS
1. LITTLE INTEREST OR PLEASURE IN DOING THINGS: NOT AT ALL
7. TROUBLE CONCENTRATING ON THINGS, SUCH AS READING THE NEWSPAPER OR WATCHING TELEVISION: NEARLY EVERY DAY
8. MOVING OR SPEAKING SO SLOWLY THAT OTHER PEOPLE COULD HAVE NOTICED. OR THE OPPOSITE, BEING SO FIGETY OR RESTLESS THAT YOU HAVE BEEN MOVING AROUND A LOT MORE THAN USUAL: NOT AT ALL
6. FEELING BAD ABOUT YOURSELF - OR THAT YOU ARE A FAILURE OR HAVE LET YOURSELF OR YOUR FAMILY DOWN: MORE THAN HALF THE DAYS
4. FEELING TIRED OR HAVING LITTLE ENERGY: NEARLY EVERY DAY

## 2024-04-02 ASSESSMENT — PAIN SCALES - GENERAL
PAINLEVEL: 0 - NO PAIN
PAIN_LEVEL: 6

## 2024-04-02 ASSESSMENT — PAIN DESCRIPTION - PAIN TYPE
TYPE: ACUTE PAIN

## 2024-04-02 ASSESSMENT — FIBROSIS 4 INDEX
FIB4 SCORE: 0.4
FIB4 SCORE: 0.4

## 2024-04-02 NOTE — ANESTHESIA PREPROCEDURE EVALUATION
Date: 04/02/24  Procedure: Labor Epidural         Relevant Problems   GI   (positive) Hiatal hernia       Physical Exam    Airway   Mallampati: II  TM distance: >3 FB  Neck ROM: full       Cardiovascular - normal exam  Rhythm: regular  Rate: normal  (-) murmur     Dental - normal exam           Pulmonary - normal exam  Breath sounds clear to auscultation     Abdominal    Neurological - normal exam                   Anesthesia Plan    ASA 2       Plan - epidural   Neuraxial block will be labor analgesia                  Pertinent diagnostic labs and testing reviewed    Informed Consent:    Anesthetic plan and risks discussed with patient.

## 2024-04-02 NOTE — H&P
OB H&P:    CC: active labor     HPI:  Ms. Humaira Mejia is a 18 y.o.  @ 39w0d by 27h8hPF presents for active labor from the office. Pt was noted to be dilated at 6-7cm in clinic and toño on toco. Patient has history of iron deficiency anemia in pregnancy but otherwise no complications with pregnancy. Pt reports she feels abdominal cramping but is not in very much pain. She feels well today.     Contractions: Yes   Loss of fluid: No   Vaginal bleeding: No   Fetal movement: present       PNC with [facility]    PNL:  Blood Type A+, Rubella immune, HIV neg, TrepAb neg, HBsAg NR, GC/CT neg/neg  1 HR GTT: 87  GBS neg      ROS:  Const: denies fevers, general concerns  CV/resp: reports no concerns  GI: denies abd pain, GI concerns  : see HPI  Neuro: denies HA/vision changes    OB History    Para Term  AB Living   2 1 1     1   SAB IAB Ectopic Molar Multiple Live Births           0 1      # Outcome Date GA Lbr Lucio/2nd Weight Sex Delivery Anes PTL Lv   2 Current            1 Term 23 38w5d / 00:59 3.31 kg (7 lb 4.8 oz) M Vag-Spont EPI N AYANA       GYN: denies STIs, no cervical procedures    Past Medical History:   Diagnosis Date    Hiatal hernia     Hypertension affecting pregnancy in third trimester        No past surgical history on file.    No current facility-administered medications on file prior to encounter.     Current Outpatient Medications on File Prior to Encounter   Medication Sig Dispense Refill    ferrous sulfate 325 (65 Fe) MG tablet Take 1 Tablet by mouth 2 times a day. (Patient not taking: Reported on 2024) 60 Tablet 5    Prenatal Vit-Fe Fumarate-FA (PRENATAL VITAMINS) 28-0.8 MG Tab Take 1 Tablet by mouth every day. (Patient not taking: Reported on 2024) 30 Tablet 8    aspirin (ASA) 81 MG Chew Tab chewable tablet Chew 1 Tablet every day. (Patient not taking: Reported on 2024) 100 Tablet 1       Family History   Problem Relation Age of Onset    No Known  "Problems Mother     No Known Problems Father     No Known Problems Sister     No Known Problems Brother        Social History     Tobacco Use    Smoking status: Never     Passive exposure: Yes    Smokeless tobacco: Never   Vaping Use    Vaping Use: Former    Substances: Nicotine    Devices: Disposable   Substance Use Topics    Alcohol use: No    Drug use: No         PE:  Vitals:    24 1016 24 1017   BP: 119/71    Pulse: (!) 104    Resp: 16    Temp: (!) 35.7 °C (96.3 °F)    TempSrc: Temporal    SpO2: 97% 97%   Weight: 84.4 kg (186 lb)    Height: 1.626 m (5' 4\")        GEN: AAO, NAD  HEENT: normocephalic, atraumatic, EOMI  CV: rrr, no m/r/g  Resp: CTAB, no w/r/r  Abd: soft, gravid, NT  Ext: NT, no peripheral edema  Derm: no visible lesions or rashes  Neuro: grossly intact    SVE: 6-7/90%/0  FHT: Baseline 140/mod variability/+ accels/ - decels  Merrick: Contractions irregular     A/P: 18 y.o.  @ 39w0d by 10w2d presents for active labor     #SIUP @ 39w0d by 10w2d US  -adequate PNL with [facility]     #Fetal status   - FHT: cat 1      #Labor  - labor: active   -start pitocin   - pain: will offer epidural as labor progresses   -bedside US confirms vertex presentation     #GBS status  -GBS: neg      #healthcare maintenance   -TdaP UTD      Rina White, DO  PGY-1, [facility]       "

## 2024-04-02 NOTE — CARE PLAN
The patient is Stable - Low risk of patient condition declining or worsening    Shift Goals  Clinical Goals: Cervical dilation and effacement  Patient Goals: Healthy mom healthy baby  Family Goals: Adequate support of pt and baby    Progress made toward(s) clinical / shift goals:      Problem: Risk for Infection and Impaired Wound Healing  Goal: Patient will remain free from infection  Outcome: Progressing  Note: Pt will remain afebrile, and will show no s/s of infection.     Problem: Pain  Goal: Patient's pain will be alleviated or reduced to the patient’s comfort goal  Outcome: Progressing  Note: Pt continuously monitored for pain, educated on pain management options. Call light within reach, pt understands to call for RN regarding any needs or concerns.

## 2024-04-02 NOTE — ANESTHESIA PROCEDURE NOTES
Epidural Block    Date/Time: 4/2/2024 12:17 PM    Performed by: Dane Shirley D.O.  Authorized by: Dane Shirley D.O.    Patient Location:  OB  Start Time:  4/2/2024 12:17 PM  End Time:  4/2/2024 12:24 PM  Reason for Block: labor analgesia    patient identified, IV checked, site marked, risks and benefits discussed, surgical consent, monitors and equipment checked and pre-op evaluation    Patient Position:  Sitting  Prep: ChloraPrep, patient draped and sterile technique    Monitoring:  Blood pressure, continuous pulse oximetry and heart rate  Approach:  Midline  Location:  L4-L5  Injection Technique:  MASON air and MASON saline  Skin infiltration:  Lidocaine  Strength:  1%  Dose:  3ml  Needle Type:  Tuohy  Needle Gauge:  17 G  Needle Length:  3.5 in  Loss of resistance::  5.8  Catheter Size:  19 G  Catheter at Skin Depth:  13  Test Dose Result:  Negative

## 2024-04-02 NOTE — PROGRESS NOTES
1011-Pt is a  with an EDC of 4/9 at 39.0 presenting to labor and delivery from the office for a SVE of 6-7 cm. +ctx, +FM, denies LOF and VB. EFM and TOCO applied, VS taken. Pt is resting in bed comfortably at this time with call light within reach.    1040-SVE as charted.    1045-This RN updated Dr. White and Dr. Bashir on pt status/FHT/ctx/VS/SVE, POC discussed, orders received, see orders for details. Provider agreeable to small snack prior to epidural placement.     1100-Dr. White at bedside to confirm vtx presentation, vtx confirmed.     1202-This RN telephoned Dr. Shirley and updated on pt requesting epidural, provider to come to bedside for epidural placement.     1215-This RN and Dr. Shirley at bedside, time out called, test dose negative, all questions have been answered at this time.     1300-SVE as charted.    1305-This RN updated Dr. Finney on pt status/SVE/FHT/ctx, POC discussed.     1430-SVE as charted.     1435-This RN updated Dr. White on pt status/SVE, POC discussed.     1515-This RN, Dr. Finney, and Dr. White at bedside, AROM of clear fluid noted, SVE as charted, POC discussed with pt, orders received, see orders for details.    1630-SVE as charted.     1637-This RN updated Dr. Finney on pt status/SVE/FHT/ctx, POC discussed.     1737-SVE as charted.    1740-This RN and Dr. Finney continuously at bedside, updated on pt status/SVE, POC discussed.     1746- of viable baby boy, APGARs 8/9.    1810-Bedside report given to Tanna FARRAR. POC discussed, fundal rub performed. All questions have been answered at this time, care relinquished.

## 2024-04-02 NOTE — PROGRESS NOTES
Labor Progress Note:      S:  Pt reports her pain is controlled with epidural but is uncomfortable on her back. Did have 3 episodes of vomiting while in the room.     Patient Vitals for the past 4 hrs:   BP Pulse SpO2   24 1454 117/71 (!) 108 --   24 1432 120/73 (!) 112 --   24 1414 124/74 (!) 110 --   24 1353 121/77 (!) 106 --   24 1332 122/81 96 --   24 1312 123/77 (!) 104 --   24 1250 114/76 (!) 117 --   24 1245 100/59 (!) 105 --   24 1241 111/66 (!) 105 --   24 1235 101/63 (!) 107 --   24 1230 111/74 (!) 120 --   24 1226 114/78 (!) 108 --   24 1222 -- (!) 127 97 %   24 1220 (!) 130/90 (!) 127 --   24 1217 -- (!) 128 98 %   24 1212 -- (!) 116 98 %   24 1207 -- 100 99 %     Gen: AAO, NAD  GEN: Patient is awake and alert, in mild distress secondary to labor pain   Resp: breathing comfortably on room air, no wheezing or SOB   Skin: Warm, dry  : no labial lesions noted or discharge   SVE: 8//-2    FHT: Baseline 130/mod variability/+accels/- decels  toco: q3-4 min ctx      A/P: 18 y.o.  @ 39w0d by 10w2d presents for active labor     #SIUP @ 39w0d by 10w2d US  -adequate PNL with RWH      #Fetal status   - FHT: cat 1     #Labor  - labor: active   -start pitocin   - pain: controlled with epidural   -AROM at 1515      #GBS status  -GBS: neg        Rina White, DO  PGY-1, [facility]

## 2024-04-03 ENCOUNTER — PHARMACY VISIT (OUTPATIENT)
Dept: PHARMACY | Facility: MEDICAL CENTER | Age: 19
End: 2024-04-03
Payer: COMMERCIAL

## 2024-04-03 VITALS
OXYGEN SATURATION: 96 % | DIASTOLIC BLOOD PRESSURE: 74 MMHG | HEIGHT: 64 IN | WEIGHT: 186 LBS | TEMPERATURE: 98 F | BODY MASS INDEX: 31.76 KG/M2 | HEART RATE: 84 BPM | SYSTOLIC BLOOD PRESSURE: 109 MMHG | RESPIRATION RATE: 16 BRPM

## 2024-04-03 LAB
ERYTHROCYTE [DISTWIDTH] IN BLOOD BY AUTOMATED COUNT: 49.5 FL (ref 35.9–50)
HCT VFR BLD AUTO: 27.6 % (ref 37–47)
HGB BLD-MCNC: 8.2 G/DL (ref 12–16)
MCH RBC QN AUTO: 22.8 PG (ref 27–33)
MCHC RBC AUTO-ENTMCNC: 29.7 G/DL (ref 32.2–35.5)
MCV RBC AUTO: 76.7 FL (ref 81.4–97.8)
PLATELET # BLD AUTO: 185 K/UL (ref 164–446)
PMV BLD AUTO: 13.1 FL (ref 9–12.9)
RBC # BLD AUTO: 3.6 M/UL (ref 4.2–5.4)
WBC # BLD AUTO: 11.9 K/UL (ref 4.8–10.8)

## 2024-04-03 PROCEDURE — RXMED WILLOW AMBULATORY MEDICATION CHARGE

## 2024-04-03 PROCEDURE — A9270 NON-COVERED ITEM OR SERVICE: HCPCS

## 2024-04-03 PROCEDURE — 36415 COLL VENOUS BLD VENIPUNCTURE: CPT

## 2024-04-03 PROCEDURE — 700102 HCHG RX REV CODE 250 W/ 637 OVERRIDE(OP)

## 2024-04-03 PROCEDURE — 85027 COMPLETE CBC AUTOMATED: CPT

## 2024-04-03 RX ORDER — IBUPROFEN 800 MG/1
800 TABLET ORAL EVERY 8 HOURS PRN
Qty: 30 TABLET | Refills: 0 | Status: SHIPPED | OUTPATIENT
Start: 2024-04-03

## 2024-04-03 RX ORDER — ACETAMINOPHEN 500 MG
1000 TABLET ORAL EVERY 6 HOURS PRN
Qty: 30 TABLET | Refills: 0 | Status: SHIPPED | OUTPATIENT
Start: 2024-04-03

## 2024-04-03 RX ORDER — FERROUS SULFATE 325(65) MG
325 TABLET ORAL
Status: DISCONTINUED | OUTPATIENT
Start: 2024-04-03 | End: 2024-04-03 | Stop reason: HOSPADM

## 2024-04-03 RX ADMIN — FERROUS SULFATE TAB 325 MG (65 MG ELEMENTAL FE) 325 MG: 325 (65 FE) TAB at 07:48

## 2024-04-03 RX ADMIN — ACETAMINOPHEN 1000 MG: 500 TABLET, FILM COATED ORAL at 07:49

## 2024-04-03 RX ADMIN — DOCUSATE SODIUM 100 MG: 100 CAPSULE, LIQUID FILLED ORAL at 07:48

## 2024-04-03 RX ADMIN — IBUPROFEN 800 MG: 800 TABLET, FILM COATED ORAL at 02:23

## 2024-04-03 RX ADMIN — IBUPROFEN 800 MG: 800 TABLET, FILM COATED ORAL at 16:22

## 2024-04-03 ASSESSMENT — EDINBURGH POSTNATAL DEPRESSION SCALE (EPDS)
I HAVE FELT SCARED OR PANICKY FOR NO GOOD REASON: YES, SOMETIMES
I HAVE BEEN SO UNHAPPY THAT I HAVE HAD DIFFICULTY SLEEPING: NOT AT ALL
THE THOUGHT OF HARMING MYSELF HAS OCCURRED TO ME: NEVER
I HAVE BLAMED MYSELF UNNECESSARILY WHEN THINGS WENT WRONG: NOT VERY OFTEN
I HAVE FELT SAD OR MISERABLE: NO, NOT AT ALL
I HAVE LOOKED FORWARD WITH ENJOYMENT TO THINGS: RATHER LESS THAN I USED TO
THINGS HAVE BEEN GETTING ON TOP OF ME: NO, MOST OF THE TIME I HAVE COPED QUITE WELL
I HAVE BEEN ABLE TO LAUGH AND SEE THE FUNNY SIDE OF THINGS: NOT QUITE SO MUCH NOW
I HAVE BEEN ANXIOUS OR WORRIED FOR NO GOOD REASON: YES, SOMETIMES
I HAVE BEEN SO UNHAPPY THAT I HAVE BEEN CRYING: ONLY OCCASIONALLY

## 2024-04-03 ASSESSMENT — PAIN DESCRIPTION - PAIN TYPE
TYPE: ACUTE PAIN

## 2024-04-03 NOTE — ANESTHESIA POSTPROCEDURE EVALUATION
Patient: Humaira Mejia    Procedure Summary       Date: 04/02/24 Room / Location:     Anesthesia Start: 1205 Anesthesia Stop: 1746    Procedure: Labor Epidural Diagnosis:     Scheduled Providers:  Responsible Provider: Dane Shirley D.O.    Anesthesia Type: epidural ASA Status: 2            Final Anesthesia Type: epidural  Last vitals  BP   Blood Pressure: 116/68    Temp   (!) 35.7 °C (96.3 °F)    Pulse   100   Resp   16    SpO2   97 %      Anesthesia Post Evaluation    Patient location during evaluation: PACU  Patient participation: complete - patient participated  Level of consciousness: awake and alert  Pain score: 6    Airway patency: patent  Anesthetic complications: no  Cardiovascular status: hemodynamically stable  Respiratory status: acceptable  Hydration status: euvolemic    PONV: none          No notable events documented.     Nurse Pain Score: 10 (NPRS)

## 2024-04-03 NOTE — PROGRESS NOTES
0700: Received report from Rafael Ott. Patient in bed and alert.  Patient assessment completed, plan of care reviewed, oriented patient to room, paperwork and call light.   Verbalized understanding and will call if needs anything.

## 2024-04-03 NOTE — CARE PLAN
The patient is Stable - Low risk of patient condition declining or worsening    Shift Goals  Clinical Goals: pain control  Patient Goals: rooming in, bonding  Family Goals: support, bonding    Progress made toward(s) clinical / shift goals:  Patient will verbalize and demonstrate an effective bonding behavior with infant, holding infant, feeding and changing and asking appropriate questions.     Patient will continue to remain free from any signs or symptoms of infection, with no fever or increased redness or swelling.   Patient is not progressing towards the following goals:

## 2024-04-03 NOTE — PROGRESS NOTES
1900: Bedside report received from CHARAN Argueta.    2030: Pt up to toilet to void. Transported via wheelchair with infant in arms. VSS. Bands verified. Bedside report given to CHARAN Hall.    2035: Entered the pt room to find the patient in bed and crying. Requested FOB to leave the room. Sat with pt at the bedside and engaged in therapeutic communication. Pt described herself as being sad, but did not provide any more information. Pt states she feels safe at home and well supported. Asked if she wanted FOB to stay the night in the room with her and she said yes. Pt would like help with managing her feelings of being sad and would like to be provided with information to establish care with a primary care provider.    Dr. Finney updated on patient status. Orders for zoloft. MD stated that the patient should be offered information about primary care before her discharge home. Social consult ordered by CHARAN Argueta.    CHARAN Hall notified.

## 2024-04-03 NOTE — DISCHARGE INSTRUCTIONS
Pelvic rest x6 weeks  Return for follow up visit in 5 weeks.  Call or come to ED for: heavy vaginal bleeding, fever >100.4, severe abdominal pain, severe headache, chest pain, shortness of breath,  N/V, or other concerns. PATIENT DISCHARGE EDUCATION INSTRUCTION SHEET    REASONS TO CALL YOUR OBSTETRICIAN  Persistent fever, shaking, chills (Temperature higher than 100.4) may indicate you have an infection  Heavy bleeding: soaking more than 1 pad per hour; Passing clots an egg-sized clot or bigger may mean you have an postpartum hemorrhage  Foul odor from vagina or bad smelling or discolored discharge or blood  Breast infection (Mastitis symptoms); breast pain, chills, fever, redness or red streaks, may feel flu like symptoms  Urinary pain, burning or frequency  Incision that is not healing, increased redness, swelling, tenderness or pain, or any pus from episiotomy or  site may mean you have an infection  Redness, swelling, warmth, or painful to touch in the calf area of your leg may mean you have a blood clot  Severe or intensified depression, thoughts or feelings of wanting to hurt yourself or someone else   Pain in chest, obstructed breathing or shortness of breath (trouble catching your breath) may mean you are having a postpartum complication. Call your provider immediately   Headache that does not get better, even after taking medicine, a bad headache with vision changes or pain in the upper right area of your belly may mean you have high blood pressure or post birth preeclampsia. Call your provider immediately    HAND WASHING  All family and friends should wash their hands:  Before and after holding the baby  Before feeding the baby  After using the restroom or changing the baby's diaper    WOUND CARE  Ask your physician for additional care instructions. In general:   Incision:  May shower and pat incision dry   Keep the incision clean and dry  There should not be any opening or pus from the  incision  Continue to walk at home 3 times a day   Do NOT lift anything heavier than your baby (over 10 pounds)  Encourage family to help participate in care of the  to allow rest and mom time to heal  Episiotomy/Laceration  May use vahe-spray bottle, witch hazel pads and dermaplast spray for comfort  Use vahe-spray bottle after urinating to cleanse perineal area  To prevent burning during urination spray vahe-water bottle on labial area   Pat perineal area dry until episiotomy/laceration is healed  Continue to use vahe-bottle until bleeding stops as needed  If have a 2nd degree laceration or greater, a Sitz bath can offer relief from soreness, burning, and inflammation   Sitz Bath   Sit in 6 inches of warm water and soak laceration as needed until the laceration heals    VAGINAL CARE AND BLEEDING  Nothing inside vagina for 6 weeks:   No sexual intercourse, tampons or douching  Bleeding may continue for 2-4 weeks. Amount and color may vary  Soaking 1 pad or more in an hour for several hours is considered heavy bleeding  Passing large egg sized blood clots can be concerning  If you feel like you have heavy bleeding or are having increasing amount of blood clots call your Obstetrician immediately  If you begin feeling faint upon standing, feeling sick to your stomach, have clammy skin, a really fast heartbeat, have chills, start feeling confused, dizzy, sleepy or weak, or feeling like you're going to faint call your Obstetrician immediately    HYPERTENSION   Preeclampsia or gestational hypertension are types of high blood pressure that only pregnant women can get. It is important for you to be aware of symptoms to seek early intervention and treatment. If you have any of these symptoms immediately call your Obstetrician    Vision changes or blurred vision   Severe headache or pain that is unrelieved with medication and will not go away  Persistent pain in upper abdomen or shoulder   Increased swelling of face,  "feet, or hands  Difficulty breathing or shortness of breath at rest  Urinating less than usual    URINATION AND BOWEL MOVEMENTS  Eating more fiber (bran cereal, fruits, and vegetables) and drinking plenty of fluids will help to avoid constipation  Urinary frequency and urgency after childbirth is normal  If you experience any urinary pain, burning or frequency call your provider    BIRTH CONTROL  It is possible to become pregnant at any time after delivery and while breastfeeding  Plan to discuss a method of birth control with your physician at your post delivery follow up visit    POSTPARTUM BLUES  During the first few days after birth, you may experience a sense of the \"blues\" which may include impatience, irritability or even crying. These feelings come and go quickly. However, as many as 1 in 10 women experience emotional symptoms known as postpartum depression.     POSTPARTUM DEPRESSION    May start as early as the second or third day after delivery or take several weeks or months to develop. Symptoms of \"blues\" are present, but are more intense: Crying spells; loss of appetite; feelings of hopelessness or loss of control; fear of touching the baby; over concern or no concern at all about the baby; little or no concern about your own appearance/caring for yourself; and/or inability to sleep or excessive sleeping. Contact your Obstetrician if you are experiencing any of these symptoms     PREVENTING SHAKEN BABY  If you are angry or stressed, PUT THE BABY IN THE CRIB, step away, take some deep breaths, and wait until you are calm to care for the baby. DO NOT SHAKE THE BABY. You are not alone, call a supporter for help.  [facility] 24/7 crisis call line (299-534-0103) or (1-799.604.2250)  You can also text them, text \"ANSWER\" (070234)  "

## 2024-04-03 NOTE — ANESTHESIA TIME REPORT
Anesthesia Start and Stop Event Times       Date Time Event    4/2/2024 1204 Ready for Procedure     1205 Anesthesia Start     1746 Anesthesia Stop          Responsible Staff  04/02/24      Name Role Begin End    Dane Shirley D.O. Anesth 1205 174          Overtime Reason:  no overtime (within assigned shift)    Comments:

## 2024-04-03 NOTE — CARE PLAN
The patient is Stable - Low risk of patient condition declining or worsening    Shift Goals  Clinical Goals: VSS, BF, pain management  Patient Goals: rooming in, bonding  Family Goals: support, bonding    Progress made toward(s) clinical / shift goals:    Problem: Knowledge Deficit - L&D  Goal: Patient and family/caregivers will demonstrate understanding of plan of care, disease process/condition, diagnostic tests and medications  Outcome: Progressing     Problem: Risk for Excess Fluid Volume  Goal: Patient will demonstrate pulse, blood pressure and neurologic signs within expected ranges and without any respiratory complications  Outcome: Progressing     Problem: Depression  Goal: Patient and family/caregiver will verbalize accurate information about at least two of the possible causes of depression, three-four of the signs and symptoms of depression  Outcome: Progressing     Problem: Psychosocial - L&D  Goal: Patient's level of anxiety will decrease  Outcome: Progressing  Goal: Patient will be able to discuss coping skills during hospitalization  Outcome: Progressing  Goal: Patient's ability to re-evaluate and adapt role responsibilities will improve  Outcome: Progressing  Goal: Spiritual and cultural needs incorporated into hospitalization  Outcome: Progressing     Problem: Risk for Venous Thromboembolism (VTE)  Goal: VTE prevention measures will be implemented and patient will remain free from VTE  Outcome: Progressing     Problem: Risk for Infection and Impaired Wound Healing  Goal: Patient will remain free from infection  Outcome: Progressing  Goal: Patient's wound/surgical incision will decrease in size and heals properly  Outcome: Progressing     Problem: Risk for Fluid Imbalance  Goal: Patient's fluid volume balance will be maintained or improve  Outcome: Progressing     Problem: Risk for Injury  Goal: Patient and fetus will be free of preventable injury/complications  Outcome: Progressing     Problem:  Pain  Goal: Patient's pain will be alleviated or reduced to the patient’s comfort goal  Outcome: Progressing     Problem: Discharge Barriers/Planning  Goal: Patient's continuum of care needs are met  Outcome: Progressing     Problem: Skin Integrity  Goal: Skin integrity is maintained or improved  Outcome: Progressing     Problem: Pain - Standard  Goal: Alleviation of pain or a reduction in pain to the patient’s comfort goal  Outcome: Progressing     Problem: Knowledge Deficit - Standard  Goal: Patient and family/care givers will demonstrate understanding of plan of care, disease process/condition, diagnostic tests and medications  Outcome: Progressing       Patient is not progressing towards the following goals:

## 2024-04-03 NOTE — L&D DELIVERY NOTE
OB Vaginal Delivery Note  2024  Humaira Mejia  18 y.o.  Review the Delivery Report for details.   GA: 39w0d  GP:   Labor Complications:  none  EBL:   100mL  Delivery Type:    ROM to Delivery Time: rupture date, rupture time, delivery date, or delivery time have not been documented   Sex: Male  [place] Weight:  pending       Apgar   Totals: 8, 9 at 1 Minute and 5 Minute         Delivery Details:  At 1746 this 19y/o G2 now P2 delivered vaginally under epidural anesthesia. Patient progressed to full dilation. She began pushing in the dorsal lithotomy position. Viable male infant presented in the [facility].The anterior and posterior shoulder delivered atraumatically. There was no nuchal cord. The infant was placed on the patient's abdomen after delivery. Infant was suctioned with bulb, dried and stimulated.  The fundus was firm with massage and Pitocin 10 units IV. The cord was clamped and cut after a 60 second delay. Placenta was delivered intact with normal 3-vessel cord.     Perineum, vagina, cervix were inspected, and the following lacerations were noted: left labial laceration noted and repaired with running sutures. Excellent hemostasis was noted.       Needle counts, laparotomy sponges were correct. Infant and patient in delivery room in good and stable condition. Dr. Finney was present for the delivery.      Rina White D.O.  PGY-1 [facility]
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
## 2024-04-04 ENCOUNTER — HOSPITAL ENCOUNTER (EMERGENCY)
Facility: MEDICAL CENTER | Age: 19
End: 2024-04-05
Attending: OBSTETRICS & GYNECOLOGY | Admitting: OBSTETRICS & GYNECOLOGY
Payer: MEDICAID

## 2024-04-04 VITALS
TEMPERATURE: 97.5 F | WEIGHT: 186 LBS | BODY MASS INDEX: 31.76 KG/M2 | HEIGHT: 64 IN | SYSTOLIC BLOOD PRESSURE: 105 MMHG | OXYGEN SATURATION: 94 % | RESPIRATION RATE: 18 BRPM | DIASTOLIC BLOOD PRESSURE: 63 MMHG | HEART RATE: 101 BPM

## 2024-04-04 DIAGNOSIS — R10.32 LEFT LOWER QUADRANT ABDOMINAL PAIN: ICD-10-CM

## 2024-04-04 PROCEDURE — 99281 EMR DPT VST MAYX REQ PHY/QHP: CPT

## 2024-04-04 RX ORDER — IBUPROFEN 600 MG/1
600 TABLET ORAL ONCE
Status: COMPLETED | OUTPATIENT
Start: 2024-04-05 | End: 2024-04-05

## 2024-04-04 ASSESSMENT — FIBROSIS 4 INDEX: FIB4 SCORE: 0.42

## 2024-04-04 NOTE — PROGRESS NOTES
Pt discharged home. Discharge instructions were reviewed and given. ID bracelets matched to baby. Pt escorted out to car by CNA.

## 2024-04-04 NOTE — DISCHARGE PLANNING
Meds-to-Beds: Discharge prescription orders listed below delivered to patient's bedside. RN notified. Written information regarding the dispensed prescriptions was provided to the patient including the phone number of the pharmacy to call for any questions.     Current Outpatient Medications   Medication Sig Dispense Refill    acetaminophen (TYLENOL) 500 MG Tab Take 2 Tablets by mouth every 6 hours as needed for Mild Pain. 30 Tablet 0    ibuprofen (MOTRIN) 800 MG Tab Take 1 Tablet by mouth every 8 hours as needed for Mild Pain. 30 Tablet 0      Holly Alcala, PharmD

## 2024-04-05 ENCOUNTER — APPOINTMENT (OUTPATIENT)
Dept: RADIOLOGY | Facility: MEDICAL CENTER | Age: 19
End: 2024-04-05
Attending: MIDWIFE
Payer: MEDICAID

## 2024-04-05 PROCEDURE — A9270 NON-COVERED ITEM OR SERVICE: HCPCS | Mod: UD

## 2024-04-05 PROCEDURE — A9270 NON-COVERED ITEM OR SERVICE: HCPCS | Mod: UD | Performed by: MIDWIFE

## 2024-04-05 PROCEDURE — 76856 US EXAM PELVIC COMPLETE: CPT

## 2024-04-05 PROCEDURE — 700102 HCHG RX REV CODE 250 W/ 637 OVERRIDE(OP): Mod: UD | Performed by: MIDWIFE

## 2024-04-05 PROCEDURE — 700102 HCHG RX REV CODE 250 W/ 637 OVERRIDE(OP): Mod: UD

## 2024-04-05 RX ORDER — DOCUSATE SODIUM 100 MG/1
100 CAPSULE, LIQUID FILLED ORAL 2 TIMES DAILY
Qty: 30 CAPSULE | Refills: 0 | Status: SHIPPED | OUTPATIENT
Start: 2024-04-05

## 2024-04-05 RX ORDER — DOCUSATE SODIUM 100 MG/1
100 CAPSULE, LIQUID FILLED ORAL ONCE
Status: COMPLETED | OUTPATIENT
Start: 2024-04-05 | End: 2024-04-05

## 2024-04-05 RX ADMIN — IBUPROFEN 600 MG: 600 TABLET, FILM COATED ORAL at 00:51

## 2024-04-05 RX ADMIN — DOCUSATE SODIUM 100 MG: 100 CAPSULE, LIQUID FILLED ORAL at 00:51

## 2024-04-05 ASSESSMENT — ENCOUNTER SYMPTOMS
CONSTIPATION: 1
NEUROLOGICAL NEGATIVE: 1
EYES NEGATIVE: 1
ABDOMINAL PAIN: 1
RESPIRATORY NEGATIVE: 1
FEVER: 0
CHILLS: 0
MUSCULOSKELETAL NEGATIVE: 1
CARDIOVASCULAR NEGATIVE: 1

## 2024-04-05 NOTE — DISCHARGE INSTRUCTIONS
Postpartum Care After Vaginal Delivery  The following information offers guidance about how to care for yourself from the time you deliver your baby to 6-12 weeks after delivery (postpartum period). If you have problems or questions, contact your health care provider for more specific instructions.  Follow these instructions at home:  Vaginal bleeding  It is normal to have vaginal bleeding (lochia) after delivery. Wear a sanitary pad for bleeding and discharge.  During the first week after delivery, the amount and appearance of lochia is often similar to a menstrual period.  Over the next few weeks, it will gradually decrease to a dry, yellow-brown discharge.  For most women, lochia stops completely by 4-6 weeks after delivery, but it can vary.  Change your sanitary pads frequently. Watch for any changes in your flow, such as:  A sudden increase.  A change in color.  Large blood clots. If you pass a blood clot from your vagina, save it and call your health care provider. Do not flush blood clots down the toilet before talking with your health care provider.  Do not use tampons or douches until your health care provider approves.  If you are not breastfeeding, your period should return 6-8 weeks after delivery. If you are feeding your baby breast milk only, your period may not return until you stop breastfeeding.  Perineal care    Keep the area between the vagina and the anus (perineum) clean and dry. Use medicated pads and pain-relieving sprays or creams as told.  If you had a tear or a surgical cut in the perineum (episiotomy), check the area for signs of infection until you are healed. Check for:  More redness, swelling, or pain.  Pus or a bad smell.  You may be given a squirt bottle to use instead of wiping to clean the perineum area after you use the bathroom. Pat the area gently to dry it.  To relieve pain caused by an episiotomy, a tear, or swollen veins in the anus (hemorrhoids), take a warm sitz bath 2-4  times a day, or as many as told by your health care provider. You can use a bathtub or a basin you put over the toilet as a sitz bath.  Breast care  In the first few days after delivery, your breasts may feel heavy, full, and uncomfortable (breast engorgement). Milk may also leak from your breasts. Ask your health care provider about ways to help relieve the discomfort.  If you breastfeed:  Wear a bra that supports your breasts and fits well. Use breast pads to absorb milk that leaks.  Keep your nipples clean and dry. Apply creams and ointments as told.  You may have uterine contractions every time you breastfeed for up to several weeks after delivery. This helps your uterus return to its normal size.  If you have any problems with breastfeeding, tell your health care provider or lactation specialist.  If you do not breastfeed:  Avoid touching your breasts. Do not squeeze out (express) milk. Doing this can make your breasts produce more milk.  Wear a bra that supports your breasts and fits well. Use cold packs to help with swelling.  Talk to your health care provider about what over-the-counter medicines can help with pain.  Intimacy and sexuality  Ask your health care provider when you can engage in sexual activity. This may depend upon:  Your risk of infection.  How fast you are healing.  Your comfort and desire to engage in sexual activity.  You are able to get pregnant after delivery, even if you have not had your period. Talk with your health care provider about birth control (contraception) or family planning.  Medicines  Take over-the-counter and prescription medicines only as told by your health care provider.  Take an over-the-counter stool softener to help ease bowel movements as told by your health care provider.  If you were prescribed antibiotics, take them as told by your health care provider. Do not stop using the antibiotic even if you start to feel better.  Review all previous and current  prescriptions to check for the possible transfer into your breast milk. Ask your health care provider or lactation specialist for help if needed.  Activity  Gradually return to your normal activities as told by your health care provider.  Rest as much as possible. Try to nap while your baby is sleeping.  Eating and drinking    Drink enough fluid to keep your urine pale yellow.  To help prevent or relieve constipation, eat high-fiber foods every day.  Choose healthy eating to support breastfeeding and healing.  Take your prenatal vitamins until your health care provider tells you to stop.  General recommendations  Do not use any products that contain nicotine or tobacco. These products include cigarettes, chewing tobacco, and vaping devices, such as e-cigarettes. These can delay healing. If you need help quitting, ask your health care provider.  Secondhand smoke exposure is dangerous for your baby. It can increase the risk of illness and sudden infant death syndrome (SIDS).  Nicotine and other chemicals pass through breast milk to the baby.  Alcohol use can be dangerous during the postpartum period. Drinking alcohol may impair your judgment and ability to safely care for your baby.  Not drinking alcohol is the safest option for breastfeeding mothers. Alcohol passes through breast milk to the baby. Alcohol can be damaging to your baby's development, growth, and sleep patterns.  If you breastfeed and drink alcohol, wait at least 2 hours after a single drink before feeding your baby.  Do not take medicines or drugs that are not prescribed to you, especially if you breastfeed.  Visit your health care provider for a postpartum checkup within the first 3-6 weeks after delivery.  Complete a comprehensive postpartum visit no later than 12 weeks after delivery.  Keep all follow-up visits. Your health care provider will check your healing after delivery and also check your blood pressure.  Where to find more information  [place]  Department of Health and Human Services Office of Women's Health: womenshealth.gov  The American College of Obstetricians and Gynecologists: acog.org  Contact a health care provider if:  You feel unusually sad or worried.  Your breasts become red, painful, or hard.  You have nausea and vomiting and are unable to eat or drink anything for 24 hours.  You have a fever or other signs of infection.  You have bleeding that soaks through one pad an hour or you have blood clots the size of an egg or larger.  You have a severe headache that does not go away or you have a headache with vision changes.  Get help right away if:  You have chest pain or difficulty breathing.  You have sudden, severe leg pain.  You faint or have a seizure.  You have thoughts about hurting yourself or your baby.  You have any of the following symptoms and you were unable to reach your health care provider:  A fever or other signs of infection.  Bleeding that is soaking through one pad an hour or you have blood clots the size of an egg or larger.  A severe headache that does not go away or you have a headache with vision changes.  These symptoms may be an emergency. Get help right away. Call 911.  Do not wait to see if the symptoms will go away.  Do not drive yourself to the hospital.  Get help if you ever feel like you may hurt yourself or others, or have thoughts about taking your own life. Go to your nearest emergency room or:  Call 911.  Call the National Suicide Prevention Lifeline at 1-332.277.8528 or 028. This is open 24 hours a day.  Text the Crisis Text Line at 751777.  Summary  The period of time after you deliver your  up to 6-12 weeks after delivery is called the postpartum period.  If you are breastfeeding, review all previous and current prescriptions to check for possible transfer into breast milk.  Keep all follow-up visits. Your health care provider will check your healing after delivery and also check your blood  pressure.  Contact a health care provider if you feel unusually sad or worried, or if you experience any other complications during the postpartum period.  This information is not intended to replace advice given to you by your health care provider. Make sure you discuss any questions you have with your health care provider.  Document Revised: 04/05/2023 Document Reviewed: 03/21/2023  Elsevier Patient Education © 2023 [employer]

## 2024-04-05 NOTE — ED PROVIDER NOTES
Emergency Obstetric Consultation     Date of Service  2024    Reason for Consultation  Chief Complaint   Patient presents with    Other     Left hip ppain       History of Presenting Illness  18 y.o. female who presented 2024 two days postpartum after a . She reports left lower abdominal pain that started yesterday. She states it comes and goes, worse when she is walking around. She took Tylenol with some relief. Denies fever or chills, reports bleeding has seemed light with some small clots. She reports a bowel movement after birth, though feels constipated currently.     Review of Systems  Review of Systems   Constitutional:  Negative for chills and fever.   Eyes: Negative.    Respiratory: Negative.     Cardiovascular: Negative.    Gastrointestinal:  Positive for abdominal pain and constipation.   Genitourinary: Negative.    Musculoskeletal: Negative.    Neurological: Negative.        Obstetric History    OB History    Para Term  AB Living   2 2 2     2   SAB IAB Ectopic Molar Multiple Live Births           0 2      # Outcome Date GA Lbr Lucio/2nd Weight Sex Delivery Anes PTL Lv   2 Term 24 39w0d / 00:09 3.135 kg (6 lb 14.6 oz) M Vag-Spont EPI N AYANA   1 Term 23 38w5d / 00:59 3.31 kg (7 lb 4.8 oz) M Vag-Spont EPI N AYANA       Gynecologic History  Patient's last menstrual period was 2023 (exact date).    Medical History  Past Medical History:   Diagnosis Date    Hiatal hernia     Hypertension affecting pregnancy in third trimester        Surgical History   has no past surgical history on file.    Family History  family history includes No Known Problems in her brother, father, mother, and sister.    Social History   reports that she has never smoked. She has been exposed to tobacco smoke. She has never used smokeless tobacco. She reports that she does not drink alcohol and does not use drugs.    Medications  Medications Prior to Admission   Medication Sig Dispense  Refill Last Dose    acetaminophen (TYLENOL) 500 MG Tab Take 2 Tablets by mouth every 6 hours as needed for Mild Pain. 30 Tablet 0     ibuprofen (MOTRIN) 800 MG Tab Take 1 Tablet by mouth every 8 hours as needed for Mild Pain. 30 Tablet 0     ferrous sulfate 325 (65 Fe) MG tablet Take 1 Tablet by mouth 2 times a day. 60 Tablet 5     Prenatal Vit-Fe Fumarate-FA (PRENATAL VITAMINS) 28-0.8 MG Tab Take 1 Tablet by mouth every day. 30 Tablet 8     aspirin (ASA) 81 MG Chew Tab chewable tablet Chew 1 Tablet every day. (Patient not taking: Reported on 2024) 100 Tablet 1        Allergies  Allergies   Allergen Reactions    Horse Allergy Itching    Pollen Extract Runny Nose     Runny nose       Physical Exam  Maternal Exam:  Abdomen: Patient reports the following abdominal tenderness: LLQ.   Abdominal:      Tenderness: There is abdominal tenderness in the left lower quadrant.             Laboratory  Recent Labs     24  1039 24  0419   WBC 11.5* 11.9*   RBC 4.00* 3.60*   HEMOGLOBIN 9.3* 8.2*   HEMATOCRIT 30.5* 27.6*   MCV 76.3* 76.7*   MCH 23.3* 22.8*   MCHC 30.5* 29.7*   RDW 49.4 49.5   PLATELETCT 229 185   MPV  --  13.1*       Assessment & Plan     at postpartum day 2 s/p   -normal PP exam, bleeding WNL    LLQ abdominal pain  -Ibuprofen for pain management  -Abdominal US to r/o retained products    3. Constipation  -Colace     4. Hx PPD  -Referral to psychiatry placed after birth, denies SI/HI at this time. Reports support at home. Discussed coming to office earlier with any concerns for PPD, or reporting to ED with any acute concerns for SI/HI. Does not want to be on medication currently.       Chloe Penn C.N.M.      2024 @ 0228    Patient reports that pain has decreased with Ibuprofen. Normal ultrasound results reviewed. Discussed possible etiologies of pain in postpartum period and recommendation for to alternate Tylenol and Ibuprofen to control pain. Discussed heat packs for comfort. We  also discussed abnormal signs in PP period and when to return to [facility]. Rx for colace sent to pharmacy per pts preference.     Patient stable for discharge home

## 2024-04-05 NOTE — PROGRESS NOTES
2330: pt to labor and delivery, pt c/o left pelvic pain.  Pt is PPD 2,  on .  Fundus firm 2 below umbilicus. Light lochia noted. Left side of uterus palpated, pt reports pain on palpation. Report to nestor davis.  Nestor DAVIS at , assessment done.     0300: us report reviewed by yudith kay cnm. Orders to discharge home.  Pt feeling better after ibuprofen and heat pack. Postpartum precautions given. Pt discharged home in stable condition.

## 2024-04-18 NOTE — ED NOTES
Pt discharged to home. Pt was given follow up instructions and prescriptions. Pt verbalized understanding of all instructions for discharge and is ambulatory out of ED with steady gait. Aox4     No abnormal movements

## 2024-06-05 ENCOUNTER — APPOINTMENT (OUTPATIENT)
Dept: OBGYN | Facility: CLINIC | Age: 19
End: 2024-06-05
Payer: MEDICAID

## 2024-06-05 VITALS — DIASTOLIC BLOOD PRESSURE: 76 MMHG | BODY MASS INDEX: 32.1 KG/M2 | SYSTOLIC BLOOD PRESSURE: 102 MMHG | WEIGHT: 187 LBS

## 2024-06-05 DIAGNOSIS — Z30.42 ENCOUNTER FOR DEPO-PROVERA CONTRACEPTION: ICD-10-CM

## 2024-06-05 LAB
POCT INT CON NEG: NEGATIVE
POCT INT CON POS: POSITIVE
POCT URINE PREGNANCY TEST: NEGATIVE

## 2024-06-05 PROCEDURE — 3074F SYST BP LT 130 MM HG: CPT | Performed by: NURSE PRACTITIONER

## 2024-06-05 PROCEDURE — 96372 THER/PROPH/DIAG INJ SC/IM: CPT | Performed by: NURSE PRACTITIONER

## 2024-06-05 PROCEDURE — 0503F POSTPARTUM CARE VISIT: CPT | Performed by: NURSE PRACTITIONER

## 2024-06-05 PROCEDURE — 81025 URINE PREGNANCY TEST: CPT | Performed by: NURSE PRACTITIONER

## 2024-06-05 PROCEDURE — 3078F DIAST BP <80 MM HG: CPT | Performed by: NURSE PRACTITIONER

## 2024-06-05 RX ORDER — LEVONORGESTREL 1.5 MG/1
1.5 TABLET ORAL ONCE
Qty: 1 TABLET | Refills: 0 | Status: SHIPPED | OUTPATIENT
Start: 2024-06-05 | End: 2024-06-05

## 2024-06-05 RX ORDER — MEDROXYPROGESTERONE ACETATE 150 MG/ML
150 INJECTION, SUSPENSION INTRAMUSCULAR ONCE
Status: COMPLETED | OUTPATIENT
Start: 2024-06-05 | End: 2024-06-05

## 2024-06-05 RX ADMIN — MEDROXYPROGESTERONE ACETATE 150 MG: 150 INJECTION, SUSPENSION INTRAMUSCULAR at 13:57

## 2024-06-05 ASSESSMENT — EDINBURGH POSTNATAL DEPRESSION SCALE (EPDS)
I HAVE BEEN SO UNHAPPY THAT I HAVE BEEN CRYING: ONLY OCCASIONALLY
I HAVE BLAMED MYSELF UNNECESSARILY WHEN THINGS WENT WRONG: YES, MOST OF THE TIME
THINGS HAVE BEEN GETTING ON TOP OF ME: YES, SOMETIMES I HAVEN'T BEEN COPING AS WELL AS USUAL
I HAVE FELT SCARED OR PANICKY FOR NO GOOD REASON: NO, NOT MUCH
THE THOUGHT OF HARMING MYSELF HAS OCCURRED TO ME: NEVER
TOTAL SCORE: 12
I HAVE BEEN ABLE TO LAUGH AND SEE THE FUNNY SIDE OF THINGS: NOT QUITE SO MUCH NOW
I HAVE BEEN ANXIOUS OR WORRIED FOR NO GOOD REASON: YES, SOMETIMES
I HAVE FELT SAD OR MISERABLE: NOT VERY OFTEN
I HAVE BEEN SO UNHAPPY THAT I HAVE HAD DIFFICULTY SLEEPING: NOT VERY OFTEN
I HAVE LOOKED FORWARD WITH ENJOYMENT TO THINGS: AS MUCH AS I EVER DID

## 2024-06-05 ASSESSMENT — ENCOUNTER SYMPTOMS
PSYCHIATRIC NEGATIVE: 1
CONSTITUTIONAL NEGATIVE: 1
RESPIRATORY NEGATIVE: 1
CARDIOVASCULAR NEGATIVE: 1
GASTROINTESTINAL NEGATIVE: 1
NEUROLOGICAL NEGATIVE: 1
EYES NEGATIVE: 1
MUSCULOSKELETAL NEGATIVE: 1

## 2024-06-05 ASSESSMENT — FIBROSIS 4 INDEX: FIB4 SCORE: 0.42

## 2024-06-05 NOTE — PROGRESS NOTES
Subjective     Humaira Mejia is a 18 y.o. female who presents with No chief complaint on file.            S   19 y/o now  s/p  on 24 of baby without complications. Right labial laceration repaired. Now 6 weeks postpartum. Prenatal course significant for short interval between pregnancies, hx of anxiety/depression. Postpartum course without any complications. Feeling well and happy with baby, denies any severe mood swings or s/sx of postpartum depression and anxiety. Denies any SI/HI. Reports her moods are overall stable, just some days she feels more sad than others but she can control it for her children.     Baby is doing well, formula exclusively.  No issues with breastfeeding at this time.  Has resumed sexual activity a few weeks ago, without protection, mostly recently yesterday.  Mother reports no issues with bowel or bladder routine, continued regular diet. Bleeding since birth has subsided at this time with no return to menses. No vaginal pain/odor/itching, fever, headaches, dizziness/SOB or dysuria. Desires depo for contraception.     O  See PE: Physical exam today with no abnormal findings  Vital signs WNL: BP and weight as notated  PAP: due age 19    A  Reassuring exam of lactating postpartum woman s/p  on 24     P  - Rx depo for contraception//follow up with us for nexplanon  - Recommended Plan B today or tomorrow but pt not sure if she can afford it if insurance does not cover it  - Reviewed potential for mood change with nexplanon due to prior depression hx but pt wants to still try method  - Reviewed condom use for 7 days after depo initiation and needs to place nexplanon before shot expires in three months  - Reviewed potential bleeding changes with depo and nexplanon  - Condoms given today   - Resumption of sexual activity: safe sex precautions given  - Counseling on nutrition, adequate hydration, and exercise   - Kegel Exercises for pelvic floor/prevention of urinary incontinence    - EPDS: 12  - Counseling on PAP guidelines re: next PAP due at age 19  - Warning s/sx of postpartum infection, depression, preeclampsia   - RTC in 2 weeks for nexplanon              Review of Systems   Constitutional: Negative.    HENT: Negative.     Eyes: Negative.    Respiratory: Negative.     Cardiovascular: Negative.    Gastrointestinal: Negative.    Genitourinary: Negative.    Musculoskeletal: Negative.    Skin: Negative.    Neurological: Negative.    Endo/Heme/Allergies: Negative.    Psychiatric/Behavioral: Negative.                Objective     LMP 07/04/2023 (Exact Date)      Physical Exam  Constitutional:       Appearance: Normal appearance.   HENT:      Head: Normocephalic.      Nose: Nose normal.   Pulmonary:      Effort: Pulmonary effort is normal.   Musculoskeletal:         General: Normal range of motion.      Cervical back: Normal range of motion.   Skin:     General: Skin is warm.      Capillary Refill: Capillary refill takes less than 2 seconds.   Neurological:      Mental Status: She is alert and oriented to person, place, and time.   Psychiatric:         Mood and Affect: Mood normal.         Behavior: Behavior normal.         Thought Content: Thought content normal.         Judgment: Judgment normal.                             Assessment & Plan        1. Postpartum care following vaginal delivery

## 2024-06-05 NOTE — PROGRESS NOTES
Pt is here today for postpartum visit.   Delivery type : 4/2/24 @39w via vag-spont  Currently formula feeding .   LMP : 5/19/24  BCM : Nexplanon - Discuss   Last pap : N/A   EPDS : 12  Pt states she has no concerns.       Date of last Depo Provera Injection : N/A - First dose  Current date within therapeutic range? : N/A   Urine pregnancy test done (needed if out of date range) : Negative   Date of office last visit : 4/2/24  Date of last pap (if > 21 years old)/ GYN exam : N/A   Dx: Contraceptive use   Order and dose verified by : Scanner   Consent form signed ? : Yes   Patient tolerated injection and no adverse effects were observed or reported : Yes   # of Administrations remaining in MAR : 0  Next injection due between : Aug 21 - Sept 4, 2024  Given in : L Deltoid

## 2024-07-09 ENCOUNTER — GYNECOLOGY VISIT (OUTPATIENT)
Dept: OBGYN | Facility: CLINIC | Age: 19
End: 2024-07-09
Payer: MEDICAID

## 2024-07-09 VITALS — DIASTOLIC BLOOD PRESSURE: 72 MMHG | WEIGHT: 173 LBS | BODY MASS INDEX: 29.7 KG/M2 | SYSTOLIC BLOOD PRESSURE: 122 MMHG

## 2024-07-09 DIAGNOSIS — Z30.430 ENCOUNTER FOR IUD INSERTION: ICD-10-CM

## 2024-07-09 LAB
POCT INT CON NEG: NEGATIVE
POCT INT CON POS: POSITIVE
POCT URINE PREGNANCY TEST: NEGATIVE

## 2024-07-09 PROCEDURE — 81025 URINE PREGNANCY TEST: CPT | Mod: GC | Performed by: OBSTETRICS & GYNECOLOGY

## 2024-07-09 PROCEDURE — 58300 INSERT INTRAUTERINE DEVICE: CPT | Mod: GC | Performed by: OBSTETRICS & GYNECOLOGY

## 2024-07-09 PROCEDURE — 3078F DIAST BP <80 MM HG: CPT | Performed by: OBSTETRICS & GYNECOLOGY

## 2024-07-09 PROCEDURE — 3074F SYST BP LT 130 MM HG: CPT | Performed by: OBSTETRICS & GYNECOLOGY

## 2024-07-09 ASSESSMENT — FIBROSIS 4 INDEX: FIB4 SCORE: 0.42

## 2025-01-18 ENCOUNTER — OFFICE VISIT (OUTPATIENT)
Dept: URGENT CARE | Facility: PHYSICIAN GROUP | Age: 20
End: 2025-01-18
Payer: MEDICAID

## 2025-01-18 VITALS
RESPIRATION RATE: 18 BRPM | TEMPERATURE: 98.6 F | HEART RATE: 90 BPM | DIASTOLIC BLOOD PRESSURE: 70 MMHG | SYSTOLIC BLOOD PRESSURE: 116 MMHG | HEIGHT: 64 IN | BODY MASS INDEX: 34.15 KG/M2 | OXYGEN SATURATION: 99 % | WEIGHT: 200 LBS

## 2025-01-18 DIAGNOSIS — H66.003 ACUTE SUPPURATIVE OTITIS MEDIA OF BOTH EARS WITHOUT SPONTANEOUS RUPTURE OF TYMPANIC MEMBRANES, RECURRENCE NOT SPECIFIED: Primary | ICD-10-CM

## 2025-01-18 DIAGNOSIS — R42 DIZZINESS: ICD-10-CM

## 2025-01-18 PROCEDURE — 99213 OFFICE O/P EST LOW 20 MIN: CPT | Performed by: PHYSICIAN ASSISTANT

## 2025-01-18 PROCEDURE — 3074F SYST BP LT 130 MM HG: CPT | Performed by: PHYSICIAN ASSISTANT

## 2025-01-18 PROCEDURE — 3078F DIAST BP <80 MM HG: CPT | Performed by: PHYSICIAN ASSISTANT

## 2025-01-18 RX ORDER — AMOXICILLIN 500 MG/1
500 CAPSULE ORAL 2 TIMES DAILY
Qty: 14 CAPSULE | Refills: 0 | Status: SHIPPED | OUTPATIENT
Start: 2025-01-18 | End: 2025-01-25

## 2025-01-18 ASSESSMENT — FIBROSIS 4 INDEX: FIB4 SCORE: 0.44

## 2025-01-18 ASSESSMENT — ENCOUNTER SYMPTOMS: DIZZINESS: 1

## 2025-01-18 NOTE — PROGRESS NOTES
"Subjective     Humaira Mejia is a 19 y.o. female who presents with Other (Lightheaded, dizzy ness x 3 days, is now feeling better )    PMH:  has a past medical history of Hiatal hernia.  MEDS:   Current Outpatient Medications:     amoxicillin (AMOXIL) 500 MG Cap, Take 1 Capsule by mouth 2 times a day for 7 days., Disp: 14 Capsule, Rfl: 0  ALLERGIES:   Allergies   Allergen Reactions    Horse Allergy Itching    Pollen Extract Runny Nose     Runny nose     SURGHX: History reviewed. No pertinent surgical history.  SOCHX:  reports that she has never smoked. She has been exposed to tobacco smoke. She has never used smokeless tobacco. She reports that she does not drink alcohol and does not use drugs.  FH: Reviewed with patient, not pertinent to this visit.           Patient presents with feeling dizziness, lightheaded and mild vertigo like symptoms.  PT notices mostly when changing positions and standing up quickly to tend to her kids. PT denies cough, sore throat, cold like symptoms but has had intermittent runny nose this winter. PT has not taken anything for her symptoms.  No other complaints.           Other  Associated symptoms include congestion.       Review of Systems   HENT:  Positive for congestion.    Neurological:  Positive for dizziness.   All other systems reviewed and are negative.             Objective     /70 (BP Location: Right arm, Patient Position: Sitting, BP Cuff Size: Adult)   Pulse 90   Temp 37 °C (98.6 °F) (Temporal)   Resp 18   Ht 1.626 m (5' 4\")   Wt 90.7 kg (200 lb)   SpO2 99%   BMI 34.33 kg/m²      Physical Exam  Vitals and nursing note reviewed.   Constitutional:       General: She is not in acute distress.     Appearance: Normal appearance. She is well-developed. She is obese. She is not ill-appearing or toxic-appearing.   HENT:      Head: Normocephalic and atraumatic.      Right Ear: A middle ear effusion is present. Tympanic membrane is erythematous and retracted.      Left Ear: " A middle ear effusion is present. Tympanic membrane is injected and retracted.      Nose: Rhinorrhea present. Rhinorrhea is clear.      Mouth/Throat:      Lips: Pink.      Mouth: Mucous membranes are moist.      Pharynx: Oropharynx is clear. Uvula midline.   Eyes:      Extraocular Movements: Extraocular movements intact.      Conjunctiva/sclera: Conjunctivae normal.      Pupils: Pupils are equal, round, and reactive to light.   Cardiovascular:      Rate and Rhythm: Normal rate and regular rhythm.      Pulses: Normal pulses.      Heart sounds: Normal heart sounds.   Pulmonary:      Effort: Pulmonary effort is normal.      Breath sounds: Normal breath sounds.   Abdominal:      General: Bowel sounds are normal.      Palpations: Abdomen is soft.   Musculoskeletal:         General: Normal range of motion.      Cervical back: Normal range of motion and neck supple.   Skin:     General: Skin is warm and dry.      Capillary Refill: Capillary refill takes less than 2 seconds.   Neurological:      General: No focal deficit present.      Mental Status: She is alert and oriented to person, place, and time.      Cranial Nerves: No cranial nerve deficit.      Motor: Motor function is intact.      Coordination: Coordination is intact.      Gait: Gait normal.   Psychiatric:         Mood and Affect: Mood normal.                             Assessment & Plan        Assessment & Plan  Acute suppurative otitis media of both ears without spontaneous rupture of tympanic membranes, recurrence not specified    Orders:    amoxicillin (AMOXIL) 500 MG Cap; Take 1 Capsule by mouth 2 times a day for 7 days.    Dizziness    Orders:    amoxicillin (AMOXIL) 500 MG Cap; Take 1 Capsule by mouth 2 times a day for 7 days.              PT HPI and PE are consistent with acute otitis media of both ears causing some vertigo type symptoms.  I will treat with amoxicillin BID x 7 days.     OTC decongestant may be helpful with the dizziness symptoms.      Pt  instructed not to drive while dizziness symptoms are present.  PT verbalized agreement with these instructions.     Differential diagnosis, supportive care, and indications for immediate follow-up discussed with patient.  Instructed to return to clinic or nearest emergency department for any change in condition, further concerns, or worsening of symptoms.    I personally reviewed prior external notes and test results pertinent to today's visit.  I have independently reviewed and interpreted all diagnostics ordered during this urgent care visit.    PT should follow up with PCP in 1-2 days for re-evaluation if symptoms have not improved.      Discussed red flags and reasons to return to UC or ED.      Pt and/or family verbalized understanding of diagnosis and follow up instructions and was offered informational handout on diagnosis.  PT discharged.     Please note that this dictation was created using voice recognition software. I have made every reasonable attempt to correct obvious errors, but I expect that there may be errors of grammar and possibly content that I did not discover before finalizing the note.

## 2025-04-30 ENCOUNTER — GYNECOLOGY VISIT (OUTPATIENT)
Dept: OBGYN | Facility: CLINIC | Age: 20
End: 2025-04-30

## 2025-04-30 VITALS
BODY MASS INDEX: 38.24 KG/M2 | SYSTOLIC BLOOD PRESSURE: 100 MMHG | HEIGHT: 64 IN | DIASTOLIC BLOOD PRESSURE: 70 MMHG | WEIGHT: 224 LBS

## 2025-04-30 DIAGNOSIS — Z30.431 IUD CHECK UP: ICD-10-CM

## 2025-04-30 ASSESSMENT — FIBROSIS 4 INDEX: FIB4 SCORE: 0.44

## 2025-04-30 NOTE — PROGRESS NOTES
"GYN FOLLOW UP VISIT    CC:  No chief complaint on file.       HPI: Patient is a 19 y.o.  who presents for IUD string check. She had the Mirena IUD placed 2024. She states that she never followed up for a string check and just wants to make sure that everything looks ok. She does not have a monthly menstrual cycle, but occasionally will have some mild cramping and spotting. Has had intercourse with IUD and denies any concerns.        ROS:   General: denies fever / chills  HEENT: denies sore throat:  CV: denies chest pain:  Repiratory: denies shortness of breath  GI: denies abdominal pain  : denies dysuria:    PFSH:  I personally reviewed the past medical and surgical histories.       PHYSICAL EXAMINATION:  Vital Signs:   Vitals:    25 1346   BP: 100/70   Weight: 224 lb   Height: 5' 4\"     Body mass index is 38.45 kg/m².    Gen: appears well, NAD  Respiratory: normal effort  Abdomen: Soft, non-tender.  Pelvic Exam:    Vulva: normal.    Urethra: normal.   Vagina: normal.    Cervix: normal. IUD strings noted 2cm from OS   Uterus: normal size, shape and contour.    left adnexa: normal.   right adnexa: normal.   Perineum: normal.    ASSESSMENT AND PLAN:  19 y.o.      Assessment & Plan  IUD check up  Pt comes in for IUD check. Had placed 2024. Denies any concerns or complaints. On exam IUD is in place and strings present approximately 2 cm from OS. Reassured her that her IUD was in correct placement. She is due for a pap smear now that she is 19. Encouraged her to make an appointment at the  for this. She verbalizes her understanding.              Time spent: 15 minutes      Tricia Byrnes P.A.-C.   "

## 2025-05-08 ENCOUNTER — APPOINTMENT (OUTPATIENT)
Dept: OBGYN | Facility: CLINIC | Age: 20
End: 2025-05-08

## 2025-06-10 ENCOUNTER — APPOINTMENT (OUTPATIENT)
Dept: OBGYN | Facility: CLINIC | Age: 20
End: 2025-06-10